# Patient Record
Sex: MALE | Race: WHITE | NOT HISPANIC OR LATINO | Employment: UNEMPLOYED | URBAN - METROPOLITAN AREA
[De-identification: names, ages, dates, MRNs, and addresses within clinical notes are randomized per-mention and may not be internally consistent; named-entity substitution may affect disease eponyms.]

---

## 2023-06-01 LAB
ALBUMIN (G/DL) IN SER/PLAS: 3.9 G/DL (ref 3.4–5)
ANION GAP IN SER/PLAS: 14 MMOL/L (ref 10–20)
CALCIUM (MG/DL) IN SER/PLAS: 10.4 MG/DL (ref 8.6–10.6)
CARBON DIOXIDE, TOTAL (MMOL/L) IN SER/PLAS: 25 MMOL/L (ref 21–32)
CHLORIDE (MMOL/L) IN SER/PLAS: 104 MMOL/L (ref 98–107)
CREATININE (MG/DL) IN SER/PLAS: 0.89 MG/DL (ref 0.5–1.3)
CREATININE (MG/DL) IN URINE: 81 MG/DL (ref 20–370)
ERYTHROCYTE DISTRIBUTION WIDTH (RATIO) BY AUTOMATED COUNT: 12.9 % (ref 11.5–14.5)
ERYTHROCYTE MEAN CORPUSCULAR HEMOGLOBIN CONCENTRATION (G/DL) BY AUTOMATED: 31.8 G/DL (ref 32–36)
ERYTHROCYTE MEAN CORPUSCULAR VOLUME (FL) BY AUTOMATED COUNT: 91 FL (ref 80–100)
ERYTHROCYTES (10*6/UL) IN BLOOD BY AUTOMATED COUNT: 5.33 X10E12/L (ref 4.5–5.9)
GFR MALE: >90 ML/MIN/1.73M2
GLUCOSE (MG/DL) IN SER/PLAS: 135 MG/DL (ref 74–99)
HEMATOCRIT (%) IN BLOOD BY AUTOMATED COUNT: 48.4 % (ref 41–52)
HEMOGLOBIN (G/DL) IN BLOOD: 15.4 G/DL (ref 13.5–17.5)
LEUKOCYTES (10*3/UL) IN BLOOD BY AUTOMATED COUNT: 10.6 X10E9/L (ref 4.4–11.3)
NRBC (PER 100 WBCS) BY AUTOMATED COUNT: 0 /100 WBC (ref 0–0)
PHOSPHATE (MG/DL) IN SER/PLAS: 2.9 MG/DL (ref 2.5–4.9)
PLATELETS (10*3/UL) IN BLOOD AUTOMATED COUNT: 343 X10E9/L (ref 150–450)
POTASSIUM (MMOL/L) IN SER/PLAS: 4.5 MMOL/L (ref 3.5–5.3)
PROTEIN (MG/DL) IN URINE: 14 MG/DL (ref 5–25)
PROTEIN/CREATININE (MG/MG) IN URINE: 0.17 MG/MG CREAT (ref 0–0.17)
SODIUM (MMOL/L) IN SER/PLAS: 138 MMOL/L (ref 136–145)
TACROLIMUS (NG/ML) IN BLOOD: 3.3 NG/ML (ref 2–15)
UREA NITROGEN (MG/DL) IN SER/PLAS: 13 MG/DL (ref 6–23)

## 2023-06-20 LAB — TACROLIMUS (NG/ML) IN BLOOD: 5.7 NG/ML (ref 2–15)

## 2023-07-07 LAB
ALBUMIN (G/DL) IN SER/PLAS: 3.9 G/DL (ref 3.4–5)
ANION GAP IN SER/PLAS: 14 MMOL/L (ref 10–20)
BASOPHILS (10*3/UL) IN BLOOD BY AUTOMATED COUNT: 0.05 X10E9/L (ref 0–0.1)
BASOPHILS/100 LEUKOCYTES IN BLOOD BY AUTOMATED COUNT: 0.5 % (ref 0–2)
CALCIUM (MG/DL) IN SER/PLAS: 10.4 MG/DL (ref 8.6–10.6)
CARBON DIOXIDE, TOTAL (MMOL/L) IN SER/PLAS: 23 MMOL/L (ref 21–32)
CHLORIDE (MMOL/L) IN SER/PLAS: 101 MMOL/L (ref 98–107)
CREATININE (MG/DL) IN SER/PLAS: 0.86 MG/DL (ref 0.5–1.3)
EOSINOPHILS (10*3/UL) IN BLOOD BY AUTOMATED COUNT: 0.19 X10E9/L (ref 0–0.7)
EOSINOPHILS/100 LEUKOCYTES IN BLOOD BY AUTOMATED COUNT: 2 % (ref 0–6)
ERYTHROCYTE DISTRIBUTION WIDTH (RATIO) BY AUTOMATED COUNT: 12 % (ref 11.5–14.5)
ERYTHROCYTE MEAN CORPUSCULAR HEMOGLOBIN CONCENTRATION (G/DL) BY AUTOMATED: 31 G/DL (ref 32–36)
ERYTHROCYTE MEAN CORPUSCULAR VOLUME (FL) BY AUTOMATED COUNT: 94 FL (ref 80–100)
ERYTHROCYTES (10*6/UL) IN BLOOD BY AUTOMATED COUNT: 4.85 X10E12/L (ref 4.5–5.9)
GFR MALE: >90 ML/MIN/1.73M2
GLUCOSE (MG/DL) IN SER/PLAS: 251 MG/DL (ref 74–99)
HEMATOCRIT (%) IN BLOOD BY AUTOMATED COUNT: 45.5 % (ref 41–52)
HEMOGLOBIN (G/DL) IN BLOOD: 14.1 G/DL (ref 13.5–17.5)
IMMATURE GRANULOCYTES/100 LEUKOCYTES IN BLOOD BY AUTOMATED COUNT: 0.5 % (ref 0–0.9)
LEUKOCYTES (10*3/UL) IN BLOOD BY AUTOMATED COUNT: 9.4 X10E9/L (ref 4.4–11.3)
LYMPHOCYTES (10*3/UL) IN BLOOD BY AUTOMATED COUNT: 1.2 X10E9/L (ref 1.2–4.8)
LYMPHOCYTES/100 LEUKOCYTES IN BLOOD BY AUTOMATED COUNT: 12.8 % (ref 13–44)
MONOCYTES (10*3/UL) IN BLOOD BY AUTOMATED COUNT: 1.09 X10E9/L (ref 0.1–1)
MONOCYTES/100 LEUKOCYTES IN BLOOD BY AUTOMATED COUNT: 11.7 % (ref 2–10)
NEUTROPHILS (10*3/UL) IN BLOOD BY AUTOMATED COUNT: 6.77 X10E9/L (ref 1.2–7.7)
NEUTROPHILS/100 LEUKOCYTES IN BLOOD BY AUTOMATED COUNT: 72.5 % (ref 40–80)
NRBC (PER 100 WBCS) BY AUTOMATED COUNT: 0 /100 WBC (ref 0–0)
PHOSPHATE (MG/DL) IN SER/PLAS: 2.4 MG/DL (ref 2.5–4.9)
PLATELETS (10*3/UL) IN BLOOD AUTOMATED COUNT: 269 X10E9/L (ref 150–450)
POTASSIUM (MMOL/L) IN SER/PLAS: 4.8 MMOL/L (ref 3.5–5.3)
SODIUM (MMOL/L) IN SER/PLAS: 133 MMOL/L (ref 136–145)
TACROLIMUS (NG/ML) IN BLOOD: 6 NG/ML (ref 2–15)
UREA NITROGEN (MG/DL) IN SER/PLAS: 17 MG/DL (ref 6–23)

## 2023-11-01 PROBLEM — Z99.2 DIALYSIS PATIENT (CMS-HCC): Status: ACTIVE | Noted: 2017-12-31

## 2023-11-01 PROBLEM — G40.909 SEIZURE DISORDER (MULTI): Status: ACTIVE | Noted: 2023-11-01

## 2023-11-01 PROBLEM — D75.1 POLYCYTHEMIA: Status: ACTIVE | Noted: 2023-11-01

## 2023-11-01 PROBLEM — E78.00 HYPERCHOLESTEREMIA: Status: ACTIVE | Noted: 2023-11-01

## 2023-11-01 PROBLEM — B17.10 ACUTE HEPATITIS C VIRUS INFECTION WITHOUT HEPATIC COMA: Status: ACTIVE | Noted: 2020-03-25

## 2023-11-01 PROBLEM — R00.2 PALPITATION: Status: ACTIVE | Noted: 2023-11-01

## 2023-11-01 PROBLEM — T85.611A PD CATHETER DYSFUNCTION (CMS-HCC): Status: ACTIVE | Noted: 2017-12-31

## 2023-11-01 PROBLEM — B19.20 HEPATITIS C VIRUS INFECTION: Status: ACTIVE | Noted: 2023-11-01

## 2023-11-01 PROBLEM — E66.9 OBESITY, CLASS II, BMI 35-39.9: Status: ACTIVE | Noted: 2023-03-17

## 2023-11-01 PROBLEM — R25.2 CRAMPING OF HANDS: Status: ACTIVE | Noted: 2023-11-01

## 2023-11-01 PROBLEM — T14.8XXA LOCAL INFECTION OF WOUND: Status: ACTIVE | Noted: 2023-11-01

## 2023-11-01 PROBLEM — T86.19 DELAYED GRAFT FUNCTION OF KIDNEY (HHS-HCC): Status: ACTIVE | Noted: 2020-02-25

## 2023-11-01 PROBLEM — T81.49XA INCISIONAL INFECTION: Status: ACTIVE | Noted: 2023-11-01

## 2023-11-01 PROBLEM — R07.9 CHEST PAIN: Status: ACTIVE | Noted: 2019-01-24

## 2023-11-01 PROBLEM — Z92.25 PERSONAL HISTORY OF IMMUNOSUPRESSION THERAPY: Status: ACTIVE | Noted: 2022-08-11

## 2023-11-01 PROBLEM — J81.0 PULMONARY EDEMA, ACUTE (MULTI): Status: ACTIVE | Noted: 2018-03-19

## 2023-11-01 PROBLEM — G45.9 TIA (TRANSIENT ISCHEMIC ATTACK): Status: ACTIVE | Noted: 2018-01-12

## 2023-11-01 PROBLEM — K21.9 GASTROESOPHAGEAL REFLUX DISEASE: Status: ACTIVE | Noted: 2023-11-01

## 2023-11-01 PROBLEM — R20.9 COLD HANDS: Status: ACTIVE | Noted: 2023-11-01

## 2023-11-01 PROBLEM — N18.9 CHRONIC KIDNEY DISEASE: Status: ACTIVE | Noted: 2023-11-01

## 2023-11-01 PROBLEM — I10 BENIGN ESSENTIAL HYPERTENSION: Status: ACTIVE | Noted: 2017-11-14

## 2023-11-01 PROBLEM — L97.421: Status: ACTIVE | Noted: 2020-07-13

## 2023-11-01 PROBLEM — R21 RASH IN ADULT: Status: ACTIVE | Noted: 2023-11-01

## 2023-11-01 PROBLEM — N18.6 ESRD ON PERITONEAL DIALYSIS (MULTI): Status: ACTIVE | Noted: 2018-03-12

## 2023-11-01 PROBLEM — Z99.2 ESRD ON PERITONEAL DIALYSIS (MULTI): Status: ACTIVE | Noted: 2018-03-12

## 2023-11-01 PROBLEM — D63.1 ANEMIA IN ESRD (END-STAGE RENAL DISEASE) (MULTI): Status: ACTIVE | Noted: 2018-03-09

## 2023-11-01 PROBLEM — D64.9 ANEMIA: Status: ACTIVE | Noted: 2018-03-09

## 2023-11-01 PROBLEM — N19 UREMIA SYNDROME: Status: ACTIVE | Noted: 2017-12-31

## 2023-11-01 PROBLEM — E66.812 OBESITY, CLASS II, BMI 35-39.9: Status: ACTIVE | Noted: 2023-03-17

## 2023-11-01 PROBLEM — R90.89 ABNORMAL MRA, BRAIN: Status: ACTIVE | Noted: 2023-11-01

## 2023-11-01 PROBLEM — N18.6 ANEMIA IN ESRD (END-STAGE RENAL DISEASE) (MULTI): Status: ACTIVE | Noted: 2018-03-09

## 2023-11-01 PROBLEM — Q61.02 MULTIPLE RENAL CYSTS: Status: ACTIVE | Noted: 2023-11-01

## 2023-11-01 PROBLEM — G45.9 TRANSIENT CEREBRAL ISCHEMIA: Status: ACTIVE | Noted: 2018-01-13

## 2023-11-01 PROBLEM — Z96.41 INSULIN PUMP STATUS: Status: ACTIVE | Noted: 2018-01-24

## 2023-11-01 PROBLEM — R73.9 ACUTE HYPERGLYCEMIA: Status: ACTIVE | Noted: 2019-01-03

## 2023-11-01 PROBLEM — M79.89 LEG SWELLING: Status: ACTIVE | Noted: 2018-02-22

## 2023-11-01 PROBLEM — E87.70 HYPERVOLEMIA: Status: ACTIVE | Noted: 2018-03-12

## 2023-11-01 PROBLEM — I16.1 HYPERTENSIVE EMERGENCY: Status: ACTIVE | Noted: 2018-01-24

## 2023-11-01 PROBLEM — G45.9 TRANSIENT ISCHEMIC ATTACK: Status: ACTIVE | Noted: 2023-11-01

## 2023-11-01 PROBLEM — N18.6 ESRD (END STAGE RENAL DISEASE) (MULTI): Status: ACTIVE | Noted: 2018-04-29

## 2023-11-01 PROBLEM — K21.9 ACID REFLUX: Status: ACTIVE | Noted: 2023-11-01

## 2023-11-01 PROBLEM — T86.19: Status: ACTIVE | Noted: 2020-03-25

## 2023-11-01 PROBLEM — R90.89 MAGNETIC RESONANCE IMAGING OF BRAIN ABNORMAL: Status: ACTIVE | Noted: 2023-11-01

## 2023-11-01 PROBLEM — L08.9 LOCAL INFECTION OF WOUND: Status: ACTIVE | Noted: 2023-11-01

## 2023-11-01 PROBLEM — Z94.0 HISTORY OF KIDNEY TRANSPLANT (HHS-HCC): Status: ACTIVE | Noted: 2020-02-24

## 2023-11-01 PROBLEM — B19.20 HCV (HEPATITIS C VIRUS): Status: ACTIVE | Noted: 2023-11-01

## 2023-11-01 PROBLEM — D84.9 IMMUNOSUPPRESSION (MULTI): Status: ACTIVE | Noted: 2023-11-01

## 2023-11-01 PROBLEM — G89.18 POST-OP PAIN: Status: ACTIVE | Noted: 2023-11-01

## 2023-11-01 PROBLEM — Q61.3 POLYCYSTIC KIDNEY: Status: ACTIVE | Noted: 2023-11-01

## 2023-11-01 PROBLEM — K59.03 DRUG-INDUCED CONSTIPATION: Status: ACTIVE | Noted: 2023-11-01

## 2023-11-01 RX ORDER — NITROGLYCERIN 0.4 MG/1
TABLET SUBLINGUAL
COMMUNITY
Start: 2016-08-12 | End: 2024-04-03 | Stop reason: WASHOUT

## 2023-11-01 RX ORDER — INSULIN ASPART 100 [IU]/ML
INJECTION, SOLUTION INTRAVENOUS; SUBCUTANEOUS
COMMUNITY
Start: 2019-05-12

## 2023-11-01 RX ORDER — ASPIRIN 81 MG/1
1 TABLET ORAL DAILY
COMMUNITY
Start: 2018-01-16

## 2023-11-01 RX ORDER — CALCIUM CARBONATE 200(500)MG
1 TABLET,CHEWABLE ORAL EVERY 4 HOURS PRN
COMMUNITY
Start: 2020-02-28 | End: 2024-04-03 | Stop reason: WASHOUT

## 2023-11-01 RX ORDER — TACROLIMUS 0.5 MG/1
CAPSULE ORAL
COMMUNITY
Start: 2020-02-26 | End: 2023-12-26 | Stop reason: SDUPTHER

## 2023-11-01 RX ORDER — PRAVASTATIN SODIUM 40 MG/1
1 TABLET ORAL NIGHTLY
COMMUNITY
Start: 2020-03-20 | End: 2024-04-03 | Stop reason: WASHOUT

## 2023-11-01 RX ORDER — CARVEDILOL 6.25 MG/1
6.25 TABLET ORAL 2 TIMES DAILY
COMMUNITY
Start: 2020-03-02 | End: 2023-12-24 | Stop reason: SDUPTHER

## 2023-11-01 RX ORDER — INSULIN LISPRO 100 [IU]/ML
INJECTION, SOLUTION INTRAVENOUS; SUBCUTANEOUS
COMMUNITY
Start: 2016-06-03

## 2023-11-01 RX ORDER — LISINOPRIL 10 MG/1
2 TABLET ORAL DAILY
COMMUNITY
Start: 2020-04-21 | End: 2024-01-02 | Stop reason: SDUPTHER

## 2023-11-01 RX ORDER — CLOTRIMAZOLE 10 MG/1
10 LOZENGE ORAL; TOPICAL 3 TIMES DAILY
COMMUNITY
End: 2024-04-03 | Stop reason: WASHOUT

## 2023-11-01 RX ORDER — AMITRIPTYLINE HYDROCHLORIDE 100 MG/1
1 TABLET ORAL DAILY
COMMUNITY
Start: 2020-02-28

## 2023-11-01 RX ORDER — LACTULOSE 10 G/15ML
15 SOLUTION ORAL; RECTAL DAILY PRN
COMMUNITY
Start: 2020-03-02 | End: 2024-04-03 | Stop reason: WASHOUT

## 2023-11-01 RX ORDER — CEPHALEXIN 500 MG/1
500 CAPSULE ORAL 2 TIMES DAILY
COMMUNITY
Start: 2020-03-20 | End: 2024-04-03 | Stop reason: WASHOUT

## 2023-11-01 RX ORDER — DOCUSATE SODIUM 100 MG/1
100 CAPSULE, LIQUID FILLED ORAL 2 TIMES DAILY
COMMUNITY
Start: 2020-02-28 | End: 2024-04-03 | Stop reason: WASHOUT

## 2023-11-01 RX ORDER — CLONIDINE HYDROCHLORIDE 0.3 MG/1
0.3 TABLET ORAL
COMMUNITY
End: 2024-03-08 | Stop reason: ALTCHOICE

## 2023-11-01 RX ORDER — POLYETHYLENE GLYCOL 3350 17 G/17G
17 POWDER, FOR SOLUTION ORAL
COMMUNITY
Start: 2020-02-28 | End: 2024-04-03 | Stop reason: WASHOUT

## 2023-11-01 RX ORDER — ACYCLOVIR 200 MG/1
CAPSULE ORAL
COMMUNITY
Start: 2020-02-28 | End: 2024-04-03 | Stop reason: WASHOUT

## 2023-11-01 RX ORDER — INSULIN GLARGINE 100 [IU]/ML
INJECTION, SOLUTION SUBCUTANEOUS
COMMUNITY
Start: 2021-05-20

## 2023-11-01 RX ORDER — OXYCODONE HYDROCHLORIDE 5 MG/1
5 TABLET ORAL EVERY 6 HOURS PRN
COMMUNITY
End: 2024-04-03 | Stop reason: WASHOUT

## 2023-11-01 RX ORDER — SULFAMETHOXAZOLE AND TRIMETHOPRIM 400; 80 MG/1; MG/1
1 TABLET ORAL
COMMUNITY
Start: 2020-02-28 | End: 2024-04-03 | Stop reason: WASHOUT

## 2023-11-01 RX ORDER — PANTOPRAZOLE SODIUM 40 MG/1
40 FOR SUSPENSION ORAL
COMMUNITY

## 2023-11-01 RX ORDER — GLUCAGON INJECTION, SOLUTION 1 MG/.2ML
INJECTION, SOLUTION SUBCUTANEOUS
COMMUNITY
Start: 2022-01-19

## 2023-11-01 RX ORDER — CALCIUM ACETATE 667 MG/1
667 CAPSULE ORAL
COMMUNITY
End: 2024-04-03 | Stop reason: WASHOUT

## 2023-11-01 RX ORDER — AMLODIPINE BESYLATE 5 MG/1
TABLET ORAL
COMMUNITY
Start: 2020-03-02 | End: 2024-03-08 | Stop reason: WASHOUT

## 2023-11-01 RX ORDER — ATORVASTATIN CALCIUM 40 MG/1
40 TABLET, FILM COATED ORAL DAILY
COMMUNITY
End: 2024-04-03 | Stop reason: WASHOUT

## 2023-11-01 RX ORDER — BLOOD SUGAR DIAGNOSTIC
STRIP MISCELLANEOUS
COMMUNITY
Start: 2017-06-28 | End: 2024-04-03 | Stop reason: WASHOUT

## 2023-11-01 RX ORDER — SILDENAFIL 50 MG/1
TABLET, FILM COATED ORAL
COMMUNITY
Start: 2021-02-22

## 2023-11-01 RX ORDER — BLOOD SUGAR DIAGNOSTIC
STRIP MISCELLANEOUS
COMMUNITY
Start: 2023-03-20

## 2023-11-01 RX ORDER — FUROSEMIDE 40 MG/1
1 TABLET ORAL DAILY
COMMUNITY
Start: 2020-02-28 | End: 2023-11-04 | Stop reason: SDUPTHER

## 2023-11-01 RX ORDER — LEVOTHYROXINE SODIUM 50 UG/1
1 TABLET ORAL DAILY
COMMUNITY
Start: 2016-12-22

## 2023-11-04 DIAGNOSIS — Z94.0 KIDNEY REPLACED BY TRANSPLANT (HHS-HCC): Primary | ICD-10-CM

## 2023-11-04 RX ORDER — FUROSEMIDE 40 MG/1
40 TABLET ORAL DAILY
Qty: 90 TABLET | Refills: 0 | Status: SHIPPED | OUTPATIENT
Start: 2023-11-04 | End: 2024-03-06 | Stop reason: SDUPTHER

## 2023-11-15 ENCOUNTER — PHARMACY VISIT (OUTPATIENT)
Dept: PHARMACY | Facility: CLINIC | Age: 38
End: 2023-11-15
Payer: COMMERCIAL

## 2023-11-15 ENCOUNTER — SPECIALTY PHARMACY (OUTPATIENT)
Dept: PHARMACY | Facility: CLINIC | Age: 38
End: 2023-11-15

## 2023-11-15 PROCEDURE — RXMED WILLOW AMBULATORY MEDICATION CHARGE

## 2023-12-24 ENCOUNTER — TELEPHONE (OUTPATIENT)
Dept: TRANSPLANT | Facility: HOSPITAL | Age: 38
End: 2023-12-24
Payer: MEDICARE

## 2023-12-24 DIAGNOSIS — Z94.0 KIDNEY REPLACED BY TRANSPLANT (HHS-HCC): ICD-10-CM

## 2023-12-24 RX ORDER — TACROLIMUS 1 MG/1
1 CAPSULE ORAL 2 TIMES DAILY
Qty: 14 CAPSULE | Refills: 0 | Status: SHIPPED | OUTPATIENT
Start: 2023-12-24 | End: 2023-12-26 | Stop reason: SDUPTHER

## 2023-12-24 RX ORDER — PREDNISONE 5 MG/1
5 TABLET ORAL DAILY
Qty: 7 TABLET | Refills: 0 | Status: SHIPPED | OUTPATIENT
Start: 2023-12-24 | End: 2023-12-26 | Stop reason: SDUPTHER

## 2023-12-24 RX ORDER — CARVEDILOL 6.25 MG/1
6.25 TABLET ORAL 2 TIMES DAILY
Qty: 14 TABLET | Refills: 0 | Status: SHIPPED | OUTPATIENT
Start: 2023-12-24 | End: 2023-12-26 | Stop reason: SDUPTHER

## 2023-12-25 NOTE — TELEPHONE ENCOUNTER
Call Coordinator: Patient called at about 1735 to report being out of his Carvedilol, Tacrolimus, and Prednisone.  His local pharmacy was closed.  [facility] in [place] open.  One week's worth of prescriptions pended to Dr. Crowley to send to [facility].  Message sent to Dr. Crowley, who signed RX refill orders.  Patient notified RX's sent and to call [facility] for estimate on  time.  Also advised to call [facility] on Tuesday morning regarding full prescriptions needed.  He thanked me and verbalized an understanding.

## 2023-12-26 ENCOUNTER — SPECIALTY PHARMACY (OUTPATIENT)
Dept: PHARMACY | Facility: CLINIC | Age: 38
End: 2023-12-26

## 2023-12-26 DIAGNOSIS — Z94.0 KIDNEY REPLACED BY TRANSPLANT (HHS-HCC): ICD-10-CM

## 2023-12-26 DIAGNOSIS — Z94.0 KIDNEY REPLACED BY TRANSPLANT (HHS-HCC): Primary | ICD-10-CM

## 2023-12-26 RX ORDER — PREDNISONE 5 MG/1
5 TABLET ORAL DAILY
Qty: 30 TABLET | Refills: 11 | Status: SHIPPED | OUTPATIENT
Start: 2023-12-26 | End: 2024-04-03 | Stop reason: WASHOUT

## 2023-12-26 RX ORDER — CARVEDILOL 6.25 MG/1
6.25 TABLET ORAL 2 TIMES DAILY
Qty: 60 TABLET | Refills: 11 | Status: SHIPPED | OUTPATIENT
Start: 2023-12-26 | End: 2024-03-08 | Stop reason: SDUPTHER

## 2023-12-26 RX ORDER — TACROLIMUS 0.5 MG/1
0.5 CAPSULE ORAL 2 TIMES DAILY
Qty: 60 CAPSULE | Refills: 11 | Status: SHIPPED | OUTPATIENT
Start: 2023-12-26 | End: 2024-05-17 | Stop reason: ALTCHOICE

## 2023-12-26 RX ORDER — MYCOPHENOLATE MOFETIL 250 MG/1
CAPSULE ORAL
Qty: 240 CAPSULE | Refills: 11 | Status: SHIPPED | OUTPATIENT
Start: 2023-12-26 | End: 2024-04-03 | Stop reason: WASHOUT

## 2023-12-26 RX ORDER — TACROLIMUS 1 MG/1
1 CAPSULE ORAL 2 TIMES DAILY
Qty: 60 CAPSULE | Refills: 11 | Status: CANCELLED | OUTPATIENT
Start: 2023-12-26 | End: 2024-12-25

## 2023-12-26 RX ORDER — TACROLIMUS 1 MG/1
1 CAPSULE ORAL 2 TIMES DAILY
Qty: 60 CAPSULE | Refills: 11 | Status: SHIPPED | OUTPATIENT
Start: 2023-12-26 | End: 2024-06-08

## 2023-12-26 NOTE — TELEPHONE ENCOUNTER
Refills sent to Dr. Marion for approval:  Tac/Pred/MMF/Coreg  To [facility]

## 2023-12-27 RX ORDER — PREDNISONE 5 MG/1
5 TABLET ORAL DAILY
Qty: 30 TABLET | Refills: 11 | Status: SHIPPED | OUTPATIENT
Start: 2023-12-27 | End: 2024-04-04 | Stop reason: SDUPTHER

## 2023-12-28 ENCOUNTER — TELEPHONE (OUTPATIENT)
Dept: TRANSPLANT | Facility: HOSPITAL | Age: 38
End: 2023-12-28
Payer: MEDICARE

## 2023-12-28 RX ORDER — TACROLIMUS 1 MG/1
1 CAPSULE ORAL 2 TIMES DAILY
Qty: 60 CAPSULE | Refills: 11 | Status: CANCELLED | OUTPATIENT
Start: 2023-12-26 | End: 2024-12-25

## 2023-12-28 RX ORDER — MYCOPHENOLATE MOFETIL 250 MG/1
CAPSULE ORAL
Qty: 240 CAPSULE | Refills: 11 | Status: CANCELLED | OUTPATIENT
Start: 2023-12-28 | End: 2024-12-27

## 2023-12-31 DIAGNOSIS — Z94.0 KIDNEY REPLACED BY TRANSPLANT (HHS-HCC): Primary | ICD-10-CM

## 2024-01-02 RX ORDER — LISINOPRIL 10 MG/1
20 TABLET ORAL DAILY
Qty: 180 TABLET | Refills: 0 | Status: SHIPPED | OUTPATIENT
Start: 2024-01-02 | End: 2024-03-08 | Stop reason: ALTCHOICE

## 2024-01-16 DIAGNOSIS — Z94.0 KIDNEY REPLACED BY TRANSPLANT (HHS-HCC): Primary | ICD-10-CM

## 2024-01-16 RX ORDER — TACROLIMUS 1 MG/1
1 CAPSULE ORAL 2 TIMES DAILY
Qty: 60 CAPSULE | Refills: 11 | Status: CANCELLED | OUTPATIENT
Start: 2023-12-26 | End: 2024-12-25

## 2024-02-16 ENCOUNTER — TELEPHONE (OUTPATIENT)
Dept: TRANSPLANT | Facility: HOSPITAL | Age: 39
End: 2024-02-16

## 2024-02-20 RX ORDER — MYCOPHENOLATE MOFETIL 250 MG/1
CAPSULE ORAL
Qty: 240 CAPSULE | Refills: 11 | Status: SHIPPED | OUTPATIENT
Start: 2024-02-20 | End: 2024-04-03 | Stop reason: SDUPTHER

## 2024-03-06 DIAGNOSIS — Z94.0 KIDNEY REPLACED BY TRANSPLANT (HHS-HCC): ICD-10-CM

## 2024-03-06 RX ORDER — FUROSEMIDE 40 MG/1
40 TABLET ORAL DAILY
Qty: 90 TABLET | Refills: 0 | Status: SHIPPED | OUTPATIENT
Start: 2024-03-06 | End: 2024-06-08

## 2024-03-08 ENCOUNTER — TELEPHONE (OUTPATIENT)
Dept: TRANSPLANT | Facility: HOSPITAL | Age: 39
End: 2024-03-08
Payer: MEDICARE

## 2024-03-08 DIAGNOSIS — I15.1 HYPERTENSION SECONDARY TO OTHER RENAL DISORDERS: ICD-10-CM

## 2024-03-08 DIAGNOSIS — Z94.0 KIDNEY REPLACED BY TRANSPLANT (HHS-HCC): ICD-10-CM

## 2024-03-08 RX ORDER — LISINOPRIL 20 MG/1
20 TABLET ORAL DAILY
Qty: 30 TABLET | Refills: 11 | Status: SHIPPED | OUTPATIENT
Start: 2024-03-08 | End: 2025-03-08

## 2024-03-08 RX ORDER — HYDRALAZINE HYDROCHLORIDE 100 MG/1
100 TABLET, FILM COATED ORAL 2 TIMES DAILY
COMMUNITY
End: 2024-03-27 | Stop reason: SDUPTHER

## 2024-03-08 RX ORDER — CARVEDILOL 6.25 MG/1
12.5 TABLET ORAL 2 TIMES DAILY
Qty: 120 TABLET | Refills: 11 | Status: SHIPPED | OUTPATIENT
Start: 2024-03-08 | End: 2024-04-03 | Stop reason: SDUPTHER

## 2024-03-09 NOTE — TELEPHONE ENCOUNTER
ON CALL:  Patient just called in, with blood pressures all day in 170/100, most recent reading 178/104 with . Face bright red per wife, no dizziness or chest pain. Does not regularly take blood pressure. Is on Hydralazine 100 mg BID (was supposed to be on TID but said only takes BID due to making him dizzy) coreg 6.25 BID, Lasix 40 mg daily. Had BP med change Feb 9th when in hospital for bone infection. At that time his lisinopril 20 mg was stopped and hydralazine was started.     Notified Dr. Leija:  PLAN:  Increase coreg to 12.5 mg BID   Start lisinopril 20 mg daily  Continue hydralazine 100 mg BID   Monitor BP daily am and pm with blood pressure log  Will get labs upcoming week (past due)   Task sent to DANA Thrasher to schedule patient as soon as possible for follow up on blood pressure management.     Gave ER precautions  Advised to call if has any other questions or concerns  Scripts updated, patient has lisinopril at home as well

## 2024-03-11 ENCOUNTER — TELEPHONE (OUTPATIENT)
Dept: TRANSPLANT | Facility: HOSPITAL | Age: 39
End: 2024-03-11
Payer: MEDICARE

## 2024-03-11 NOTE — TELEPHONE ENCOUNTER
Returned call to patient,   BP readings so far:   3/9: 140/92 am 115/82 pm  3/10: 142/83 am 138/94 pm  3/11: 120/78 am    Asked patient to continue monitoring BP and bring recordings to appointment on 4/10/24

## 2024-03-19 ENCOUNTER — SPECIALTY PHARMACY (OUTPATIENT)
Dept: PHARMACY | Facility: CLINIC | Age: 39
End: 2024-03-19

## 2024-03-27 ENCOUNTER — TELEPHONE (OUTPATIENT)
Dept: TRANSPLANT | Facility: HOSPITAL | Age: 39
End: 2024-03-27
Payer: MEDICARE

## 2024-03-27 DIAGNOSIS — I15.1 HYPERTENSION SECONDARY TO OTHER RENAL DISORDERS: ICD-10-CM

## 2024-03-27 DIAGNOSIS — Z94.0 KIDNEY REPLACED BY TRANSPLANT (HHS-HCC): ICD-10-CM

## 2024-03-27 RX ORDER — HYDRALAZINE HYDROCHLORIDE 100 MG/1
100 TABLET, FILM COATED ORAL 2 TIMES DAILY
Qty: 60 TABLET | Refills: 11 | Status: SHIPPED | OUTPATIENT
Start: 2024-03-27 | End: 2024-04-03 | Stop reason: WASHOUT

## 2024-04-03 ENCOUNTER — OFFICE VISIT (OUTPATIENT)
Dept: TRANSPLANT | Facility: CLINIC | Age: 39
End: 2024-04-03
Payer: MEDICARE

## 2024-04-03 VITALS
OXYGEN SATURATION: 98 % | WEIGHT: 237.1 LBS | DIASTOLIC BLOOD PRESSURE: 94 MMHG | TEMPERATURE: 97.2 F | SYSTOLIC BLOOD PRESSURE: 174 MMHG | BODY MASS INDEX: 38.27 KG/M2 | HEART RATE: 90 BPM

## 2024-04-03 DIAGNOSIS — I10 ESSENTIAL HYPERTENSION: ICD-10-CM

## 2024-04-03 DIAGNOSIS — E21.3 HYPERPARATHYROIDISM (MULTI): ICD-10-CM

## 2024-04-03 DIAGNOSIS — Z94.0 KIDNEY REPLACED BY TRANSPLANT (HHS-HCC): Primary | ICD-10-CM

## 2024-04-03 DIAGNOSIS — Z94.0 IMMUNOSUPPRESSIVE MANAGEMENT ENCOUNTER FOLLOWING KIDNEY TRANSPLANT (HHS-HCC): ICD-10-CM

## 2024-04-03 DIAGNOSIS — Z79.899 IMMUNOSUPPRESSIVE MANAGEMENT ENCOUNTER FOLLOWING KIDNEY TRANSPLANT (HHS-HCC): ICD-10-CM

## 2024-04-03 DIAGNOSIS — E78.9 LIPID DISORDER: ICD-10-CM

## 2024-04-03 DIAGNOSIS — E55.9 VITAMIN D DEFICIENCY: ICD-10-CM

## 2024-04-03 DIAGNOSIS — E11.21 DIABETES MELLITUS WITH KIDNEY DISEASE (MULTI): ICD-10-CM

## 2024-04-03 PROCEDURE — 3077F SYST BP >= 140 MM HG: CPT | Performed by: INTERNAL MEDICINE

## 2024-04-03 PROCEDURE — 3080F DIAST BP >= 90 MM HG: CPT | Performed by: INTERNAL MEDICINE

## 2024-04-03 PROCEDURE — 99214 OFFICE O/P EST MOD 30 MIN: CPT

## 2024-04-03 PROCEDURE — 4010F ACE/ARB THERAPY RXD/TAKEN: CPT | Performed by: INTERNAL MEDICINE

## 2024-04-03 PROCEDURE — 3008F BODY MASS INDEX DOCD: CPT | Performed by: INTERNAL MEDICINE

## 2024-04-03 RX ORDER — CARVEDILOL 25 MG/1
25 TABLET ORAL 2 TIMES DAILY
Qty: 180 TABLET | Refills: 3 | Status: SHIPPED | OUTPATIENT
Start: 2024-04-03 | End: 2025-04-03

## 2024-04-03 RX ORDER — MYCOPHENOLATE MOFETIL 250 MG/1
750 CAPSULE ORAL 2 TIMES DAILY
Qty: 540 CAPSULE | Refills: 3 | Status: SHIPPED | OUTPATIENT
Start: 2024-04-03 | End: 2025-04-03

## 2024-04-03 RX ORDER — PRAVASTATIN SODIUM 40 MG/1
40 TABLET ORAL NIGHTLY
COMMUNITY

## 2024-04-03 ASSESSMENT — PAIN SCALES - GENERAL: PAINLEVEL: 0-NO PAIN

## 2024-04-03 NOTE — PROGRESS NOTES
TRANSPLANT NEPHROLOGY :   OUTPATIENT CLINIC NOTE      SERVICE DATE : 2024    REASON FOR VISIT/CHIEF COMPLAINT:  S/P  TRANSPLANT SURGERY  IMMUNOSUPPRESSIVE MEDICATION MANAGEMENT  BLOOD PRESSURE MANAGEMENT    HPI:    Mr. Ware is a 38 y.o. male with past medical history significant for seizure disorder, HLD, HTN, ESRD secondary to type 1 diabetes ( on insulin pump) s/p a  kidney transplant on 2020,( thymo Induction and HCV positive. The patient converted to HCV positive on 3/4/2020 s/p treatment.) diabetic retinopathy, diabetic wounds s/p L toe amputation.     Patient is here for follow up s/p kidney transplant.    Hospitalization - 2024 s/p left BKA    Hydralazine made him feel sick, dizzy. We will change BP meds.     Patient is doing well overall. No new complaints. Denied chest pain, SOB, AYON, Palpitation. Normal urination and bowel movement. Normal gait and no weakness of arms/legs. No cough, runny nose, sore throat, cold symptoms, or rash. No hearing loss. Normal vision.No problems with his sleep, mood and function. No recent infection, hospitalization, surgery or ER visits.      ROS:  Review of  14 systems was performed system by system. See HPI. Otherwise, the symptoms were negative.    PAST MEDICAL HISTORY:  Past Medical History:   Diagnosis Date    Encounter for other preprocedural examination 2020    Pre-transplant evaluation for kidney transplant    Encounter for other preprocedural examination 2020    Pre-transplant evaluation for kidney and pancreas transplant    End stage renal disease (CMS/HCC) 2020    ESRD on dialysis    Transient cerebral ischemic attack, unspecified 2019    TIA (transient ischemic attack)        PAST SURGICAL HISTORY:  Past Surgical History:   Procedure Laterality Date    CT HEAD ANGIO W AND WO IV CONTRAST  3/21/2019    CT HEAD ANGIO W AND WO IV CONTRAST 3/21/2019 [facility] [place]    MR HEAD ANGIO WO IV CONTRAST  3/7/2019     MR HEAD ANGIO WO IV CONTRAST [place] [facility] [place]    MR NECK ANGIO WO IV CONTRAST  3/7/2019    MR NECK ANGIO WO IV CONTRAST [place] [facility] [place]    OTHER SURGICAL HISTORY  02/21/2019    Venous access port placement    OTHER SURGICAL HISTORY  02/21/2019    Eye surgery        SOCIAL HISTORY:  Social History     Socioeconomic History    Marital status:      Spouse name: Not on file    Number of children: Not on file    Years of education: Not on file    Highest education level: Not on file   Occupational History    Not on file   Tobacco Use    Smoking status: Not on file    Smokeless tobacco: Not on file   Substance and Sexual Activity    Alcohol use: Not on file    Drug use: Not on file    Sexual activity: Not on file   Other Topics Concern    Not on file   Social History Narrative    Not on file     Social Determinants of Health     Financial Resource Strain: Not on file   Food Insecurity: Not on file   Transportation Needs: Not on file   Physical Activity: Not on file   Stress: Not on file   Social Connections: Not on file   Intimate Partner Violence: Not on file   Housing Stability: Not on file       FAMILY HISTORY:  Family History   Problem Relation Name Age of Onset    Diabetes type II Mother      Diabetes type II Father      Diabetes type II Half-Sister Maternal        MEDICATION LIST:  Current Outpatient Medications   Medication Instructions    acyclovir (Zovirax) 200 mg capsule 1 cap(s) orally 2 times a day at 9 AM and 9 PM    amitriptyline (Elavil) 100 mg tablet 1 tablet, oral, Daily    aspirin 81 mg EC tablet 1 tablet, oral, Daily    atorvastatin (LIPITOR) 40 mg, oral, Daily    blood sugar diagnostic (Contour Next Test Strips) strip Use 5 strips daily    calcium acetate (PHOSLO) 667 mg, oral    calcium carbonate (Tums) 200 mg calcium chewable tablet 1 tablet, oral, Every 4 hours PRN, SEPARATE FROM IMMUNOSUPPRESSION MEDICATIONS BY AT LEAST 2 HOURS    carvedilol (COREG) 12.5 mg,  oral, 2 times daily    cephalexin (KEFLEX) 500 mg, oral, 2 times daily    clotrimazole (MYCELEX) 10 mg, oral, 3 times daily    docusate sodium (COLACE) 100 mg, oral, 2 times daily, for constipation while taking narcotics. Hold for loose stool    furosemide (LASIX) 40 mg, oral, Daily    glecaprevir-pibrentasvir (Mavyret) 100-40 mg tablet tablet oral    glucagon (Gvoke HypoPen 2-Pack) 1 mg/0.2 mL auto-injector Use as directed    insulin aspart (NovoLOG) 100 unit/mL injection Inject subcutaneously 60 units daily via insulin pump. DX: E10.65.    insulin glargine (Lantus Solostar U-100 Insulin) 100 unit/mL (3 mL) pen Inject subcutaneously 62 units daily if insulin pump fails. DX: E10.649    insulin lispro (HumaLOG) 100 unit/mL injection subcutaneous    insulin regular (HumuLIN R) 100 unit/mL injection subcutaneous    lactulose 20 gram/30 mL oral solution 15 mL, oral, Daily PRN    levothyroxine (Synthroid, Levoxyl) 50 mcg tablet 1 tablet, oral, Daily    lisinopril 20 mg, oral, Daily    mycophenolate (Cellcept) 250 mg capsule TAKE FOUR (4) CAPSULES BY MOUTH TWICE DAILY AS DIRECTED AT 9AM AND 9PM    mycophenolate (Cellcept) 250 mg capsule TAKE FOUR (4) CAPSULES BY MOUTH TWICE DAILY AS DIRECTED AT 9AM AND 9PM    nitroglycerin (Nitrostat) 0.4 mg SL tablet Dissolve 1 tablet under the tongue as needed. FOR CHEST PAIN. IF NO RELIEF CALL 911    OneTouch Ultra Test strip OneTouch Ultra Blue STRP Refills: 0 DO Start : 28-Jun-2017 Active    oxyCODONE (ROXICODONE) 5 mg, oral, Every 6 hours PRN    pantoprazole (PROTONIX) 40 mg, oral    polyethylene glycol (GLYCOLAX, MIRALAX) 17 g, oral, Daily RT, As needed for constipation    pravastatin (Pravachol) 40 mg tablet 1 tablet, oral, Nightly    predniSONE (Deltasone) 5 mg tablet TAKE ONE (1) TABLET BY MOUTH DAILY.    predniSONE (Deltasone) 5 mg tablet TAKE ONE (1) TABLET BY MOUTH DAILY.    sildenafil (Viagra) 50 mg tablet Take 1-2 tablets 1 hour  Before anticipated intercourse.     "sulfamethoxazole-trimethoprim (Bactrim) 400-80 mg tablet 1 tablet, oral, once a day at 9 AM    tacrolimus (Prograf) 1 mg capsule TAKE ONE (1) CAPSULE BY MOUTH TWICE DAILY    tacrolimus (PROGRAF) 0.5 mg, oral, 2 times daily, TAKE 1 CAPSULE Every twelve hours Take with 1mg capsules to equal 1.5mg every 12 hours       ALLERGY  Allergies   Allergen Reactions    Adhesive Unknown     Ape 1 \" x5yD    Nsaids (Non-Steroidal Anti-Inflammatory Drug) Unknown    Oxycodone Unknown    Penicillin Unknown    Clindamycin Rash    Morphine GI Upset, Nausea And Vomiting and Unknown    Oxycodone-Acetaminophen Nausea And Vomiting, Unknown and Nausea/vomiting       PHYSICAL EXAM:    Visit Vitals  BP (!) 174/94   Pulse 90   Temp 36.2 °C (97.2 °F) (Temporal)   Wt 108 kg (237 lb 1.6 oz)   SpO2 98%   BMI 38.27 kg/m²   BSA 2.24 m²          Vital signs - reviewed. Acceptable BP at this office visit.   General Appearance - NAD, Good speech, oriented and alert  HEENT - Supple. Not pale. No jaundice. No cervical lymphadenopathy. Pharynx and tonsils are not injected.  CVS - RRR. Normal S1/S2. No murmur, click , rub or gallop  Lungs- clear to auscultation bilaterally  Abdomen - soft , not tender, no guarding, no rigidity. No hepatosplenomegaly. Normal bowel sounds. No masses and ascites. S/P Kidney transplant .  Transplanted kidney is not tender.   Musculoskeletal /Extremities - no edema. Full ROM. No joint tenderness.   Neuro/Psych - appropriate mood and affect. Motor power V/V all extremities. CN I -XII were grossly intact.  Skin - No visible rash      LABS:    Lab Results   Component Value Date    WBC 9.4 07/06/2023    HGB 14.1 07/06/2023    HCT 45.5 07/06/2023     07/06/2023    ALT 25 06/30/2023    AST 16 06/30/2023     (L) 07/06/2023    K 4.8 07/06/2023     07/06/2023    CREATININE 0.86 07/06/2023    BUN 17 07/06/2023    CO2 23 07/06/2023    TSH 1.57 06/29/2020    INR 1.6 (H) 02/28/2020    HGBA1C 11.1 (H) 08/28/2023 "     par    ASSESSMENT AND PLAN:    Mr. Ware is a 38 y.o. male  who is here for follow up s/p kidney transplant.    TRANSPLANT DATE: 2/24/2020 (Kidney)      1. ESRD S/P kidney transplant   - Creatinine last check was :  Lab Results   Component Value Date    CREATININE 0.86 07/06/2023     Creatinine clearance cannot be calculated (Patient's most recent lab result is older than the maximum 7 days allowed.)    - Renal allograft function is GOOD, Cr 1.1 in Feb 2024  -Random urine protein/creatinine ratio is normal in Feb 2024 (see CareEverywhere)  -Ensure adequate hydration  - Avoid nephrotoxic medications, NSAIDs, and IV contrast.    2. Immunosuppression  -Tacrolimus level last check was N/A. Will repeat lab.   -Continue current immunosuppression regimen.    3. Electrolytes  Lab Results   Component Value Date    GLUCOSE 251 (H) 07/06/2023    CALCIUM 10.4 07/06/2023     (L) 07/06/2023    K 4.8 07/06/2023    CO2 23 07/06/2023     07/06/2023    BUN 17 07/06/2023    CREATININE 0.86 07/06/2023     -Acceptable from last lab drawn    4. Hypertension  Blood Pressures         4/3/2024  0832             BP: 174/94          -Home  BP had been acceptable  -Encourage to monitor home BP  -Continue current anti hypertensive medication  -STOP Hydralazine  - increase Coreg to 25 mg bid.  -if BP remains > 130/80 after increasing Coreg dosage, can increase lisinopril from 20 to 40 mg daily. Lab in 2 weeks.    5. Bone Mineral Disease/Osteoporosis  Lab Results   Component Value Date    CALCIUM 10.4 07/06/2023    CAION 1.10 02/28/2020    PHOS 2.4 (L) 07/06/2023     -check VIT D, PTH with next lab  - Consider DEXA every 2-3 years , defer to PCP    6.Anemia  Lab Results   Component Value Date    WBC 9.4 07/06/2023    HGB 14.1 07/06/2023    HCT 45.5 07/06/2023    MCV 94 07/06/2023     07/06/2023     -asymptomatic  - Continue to monitor  -check iron studies and ferritin. Will consider JUAN as needed.  - No indications  for blood transfusion     7.Health maintenance and vaccination  - Flu shot during flu season annually  - Cancer screening is up to date per the patient    Lab : in 2 weeks      RTC 6 month(s)    Debbie Leija    Transplant Nephrology

## 2024-04-04 ENCOUNTER — TELEPHONE (OUTPATIENT)
Dept: TRANSPLANT | Facility: HOSPITAL | Age: 39
End: 2024-04-04
Payer: MEDICARE

## 2024-04-04 DIAGNOSIS — Z94.0 KIDNEY REPLACED BY TRANSPLANT (HHS-HCC): Primary | ICD-10-CM

## 2024-04-04 RX ORDER — PREDNISONE 5 MG/1
5 TABLET ORAL DAILY
Qty: 90 TABLET | Refills: 3 | Status: SHIPPED | OUTPATIENT
Start: 2024-04-04 | End: 2025-04-04

## 2024-05-15 ENCOUNTER — TELEPHONE (OUTPATIENT)
Dept: TRANSPLANT | Facility: HOSPITAL | Age: 39
End: 2024-05-15
Payer: MEDICARE

## 2024-05-15 DIAGNOSIS — Z94.0 KIDNEY REPLACED BY TRANSPLANT (HHS-HCC): ICD-10-CM

## 2024-05-16 ENCOUNTER — LAB (OUTPATIENT)
Dept: LAB | Facility: LAB | Age: 39
End: 2024-05-16
Payer: MEDICARE

## 2024-05-16 DIAGNOSIS — E55.9 VITAMIN D DEFICIENCY: ICD-10-CM

## 2024-05-16 DIAGNOSIS — Z79.899 IMMUNOSUPPRESSIVE MANAGEMENT ENCOUNTER FOLLOWING KIDNEY TRANSPLANT (HHS-HCC): ICD-10-CM

## 2024-05-16 DIAGNOSIS — E21.3 HYPERPARATHYROIDISM (MULTI): ICD-10-CM

## 2024-05-16 DIAGNOSIS — Z94.0 KIDNEY REPLACED BY TRANSPLANT (HHS-HCC): ICD-10-CM

## 2024-05-16 DIAGNOSIS — Z94.0 IMMUNOSUPPRESSIVE MANAGEMENT ENCOUNTER FOLLOWING KIDNEY TRANSPLANT (HHS-HCC): ICD-10-CM

## 2024-05-16 DIAGNOSIS — I10 ESSENTIAL HYPERTENSION: ICD-10-CM

## 2024-05-16 PROCEDURE — 84156 ASSAY OF PROTEIN URINE: CPT

## 2024-05-16 PROCEDURE — 83970 ASSAY OF PARATHORMONE: CPT

## 2024-05-16 PROCEDURE — 85027 COMPLETE CBC AUTOMATED: CPT

## 2024-05-16 PROCEDURE — 80069 RENAL FUNCTION PANEL: CPT

## 2024-05-16 PROCEDURE — 80197 ASSAY OF TACROLIMUS: CPT

## 2024-05-16 PROCEDURE — 82570 ASSAY OF URINE CREATININE: CPT

## 2024-05-16 PROCEDURE — 36415 COLL VENOUS BLD VENIPUNCTURE: CPT

## 2024-05-16 PROCEDURE — 83735 ASSAY OF MAGNESIUM: CPT

## 2024-05-16 PROCEDURE — 82306 VITAMIN D 25 HYDROXY: CPT

## 2024-05-17 LAB
25(OH)D3 SERPL-MCNC: 11 NG/ML (ref 30–100)
ALBUMIN SERPL BCP-MCNC: 4.1 G/DL (ref 3.4–5)
ANION GAP SERPL CALC-SCNC: 15 MMOL/L (ref 10–20)
BUN SERPL-MCNC: 26 MG/DL (ref 6–23)
CALCIUM SERPL-MCNC: 9.9 MG/DL (ref 8.6–10.6)
CHLORIDE SERPL-SCNC: 101 MMOL/L (ref 98–107)
CO2 SERPL-SCNC: 24 MMOL/L (ref 21–32)
CREAT SERPL-MCNC: 1.38 MG/DL (ref 0.5–1.3)
CREAT UR-MCNC: 75.5 MG/DL (ref 20–370)
EGFRCR SERPLBLD CKD-EPI 2021: 66 ML/MIN/1.73M*2
ERYTHROCYTE [DISTWIDTH] IN BLOOD BY AUTOMATED COUNT: 13 % (ref 11.5–14.5)
GLUCOSE SERPL-MCNC: 315 MG/DL (ref 74–99)
HCT VFR BLD AUTO: 38.8 % (ref 41–52)
HGB BLD-MCNC: 12.1 G/DL (ref 13.5–17.5)
MAGNESIUM SERPL-MCNC: 1.88 MG/DL (ref 1.6–2.4)
MCH RBC QN AUTO: 28 PG (ref 26–34)
MCHC RBC AUTO-ENTMCNC: 31.2 G/DL (ref 32–36)
MCV RBC AUTO: 90 FL (ref 80–100)
NRBC BLD-RTO: 0 /100 WBCS (ref 0–0)
PHOSPHATE SERPL-MCNC: 4.7 MG/DL (ref 2.5–4.9)
PLATELET # BLD AUTO: 317 X10*3/UL (ref 150–450)
POTASSIUM SERPL-SCNC: 5.5 MMOL/L (ref 3.5–5.3)
PROT UR-ACNC: 5 MG/DL (ref 5–25)
PROT/CREAT UR: 0.07 MG/MG CREAT (ref 0–0.17)
PTH-INTACT SERPL-MCNC: 143.3 PG/ML (ref 18.5–88)
RBC # BLD AUTO: 4.32 X10*6/UL (ref 4.5–5.9)
SODIUM SERPL-SCNC: 134 MMOL/L (ref 136–145)
TACROLIMUS BLD-MCNC: 9.8 NG/ML
WBC # BLD AUTO: 10.3 X10*3/UL (ref 4.4–11.3)

## 2024-05-17 RX ORDER — CHOLECALCIFEROL (VITAMIN D3) 25 MCG
2000 TABLET ORAL DAILY
Qty: 60 TABLET | Refills: 11 | Status: SHIPPED | OUTPATIENT
Start: 2024-05-17 | End: 2025-05-17

## 2024-05-17 NOTE — TELEPHONE ENCOUNTER
Reviewed labs with Dr. Marion :  [facility] 4Y 2m  K 5.5 (glucose 315)  Cr 1.38 ,1.1, 1.2, 1.4  .3  Vit D 11   (calcium 9.9)  UPC 0.07  Tac 9.8 from ?     on 1.5 mg BID  No lokelma     PLAN:   Decrease Tac to 1 mg BID   Start vit d 2,000 daily   Low K diet   Feeling ok?   Repeat labs next week     Called and spoke with patient, working on a new insulin pump feeling fine.  Verbalized understanding of plan

## 2024-06-08 DIAGNOSIS — Z94.0 KIDNEY REPLACED BY TRANSPLANT (HHS-HCC): ICD-10-CM

## 2024-06-08 RX ORDER — FUROSEMIDE 40 MG/1
40 TABLET ORAL DAILY
Qty: 90 TABLET | Refills: 3 | Status: SHIPPED | OUTPATIENT
Start: 2024-06-08 | End: 2025-06-08

## 2024-06-08 RX ORDER — TACROLIMUS 1 MG/1
1 CAPSULE ORAL 2 TIMES DAILY
Qty: 180 CAPSULE | Refills: 3 | Status: SHIPPED | OUTPATIENT
Start: 2024-06-08 | End: 2025-06-08

## 2024-08-09 ENCOUNTER — LAB (OUTPATIENT)
Dept: LAB | Facility: LAB | Age: 39
End: 2024-08-09
Payer: MEDICARE

## 2024-08-09 DIAGNOSIS — E21.3 HYPERPARATHYROIDISM (MULTI): ICD-10-CM

## 2024-08-09 DIAGNOSIS — E55.9 VITAMIN D DEFICIENCY: ICD-10-CM

## 2024-08-09 DIAGNOSIS — Z79.899 IMMUNOSUPPRESSIVE MANAGEMENT ENCOUNTER FOLLOWING KIDNEY TRANSPLANT (HHS-HCC): ICD-10-CM

## 2024-08-09 DIAGNOSIS — Z94.0 KIDNEY REPLACED BY TRANSPLANT (HHS-HCC): ICD-10-CM

## 2024-08-09 DIAGNOSIS — Z94.0 IMMUNOSUPPRESSIVE MANAGEMENT ENCOUNTER FOLLOWING KIDNEY TRANSPLANT (HHS-HCC): ICD-10-CM

## 2024-08-09 DIAGNOSIS — I10 ESSENTIAL HYPERTENSION: ICD-10-CM

## 2024-08-09 LAB
ERYTHROCYTE [DISTWIDTH] IN BLOOD BY AUTOMATED COUNT: 12.6 % (ref 11.5–14.5)
HCT VFR BLD AUTO: 40.6 % (ref 41–52)
HGB BLD-MCNC: 12.7 G/DL (ref 13.5–17.5)
MCH RBC QN AUTO: 27.5 PG (ref 26–34)
MCHC RBC AUTO-ENTMCNC: 31.3 G/DL (ref 32–36)
MCV RBC AUTO: 88 FL (ref 80–100)
NRBC BLD-RTO: 0 /100 WBCS (ref 0–0)
PLATELET # BLD AUTO: 351 X10*3/UL (ref 150–450)
RBC # BLD AUTO: 4.62 X10*6/UL (ref 4.5–5.9)
WBC # BLD AUTO: 11.9 X10*3/UL (ref 4.4–11.3)

## 2024-08-09 PROCEDURE — 83735 ASSAY OF MAGNESIUM: CPT

## 2024-08-09 PROCEDURE — 80197 ASSAY OF TACROLIMUS: CPT

## 2024-08-09 PROCEDURE — 82570 ASSAY OF URINE CREATININE: CPT

## 2024-08-09 PROCEDURE — 80069 RENAL FUNCTION PANEL: CPT

## 2024-08-09 PROCEDURE — 36415 COLL VENOUS BLD VENIPUNCTURE: CPT

## 2024-08-09 PROCEDURE — 84156 ASSAY OF PROTEIN URINE: CPT

## 2024-08-09 PROCEDURE — 83970 ASSAY OF PARATHORMONE: CPT

## 2024-08-09 PROCEDURE — 85027 COMPLETE CBC AUTOMATED: CPT

## 2024-08-09 PROCEDURE — 82306 VITAMIN D 25 HYDROXY: CPT

## 2024-08-10 LAB
25(OH)D3 SERPL-MCNC: 29 NG/ML (ref 30–100)
ALBUMIN SERPL BCP-MCNC: 4 G/DL (ref 3.4–5)
ANION GAP SERPL CALC-SCNC: 18 MMOL/L (ref 10–20)
BUN SERPL-MCNC: 30 MG/DL (ref 6–23)
CALCIUM SERPL-MCNC: 10.2 MG/DL (ref 8.6–10.6)
CHLORIDE SERPL-SCNC: 102 MMOL/L (ref 98–107)
CO2 SERPL-SCNC: 20 MMOL/L (ref 21–32)
CREAT SERPL-MCNC: 1.11 MG/DL (ref 0.5–1.3)
CREAT UR-MCNC: 55.8 MG/DL (ref 20–370)
EGFRCR SERPLBLD CKD-EPI 2021: 86 ML/MIN/1.73M*2
GLUCOSE SERPL-MCNC: 276 MG/DL (ref 74–99)
MAGNESIUM SERPL-MCNC: 1.96 MG/DL (ref 1.6–2.4)
PHOSPHATE SERPL-MCNC: 4 MG/DL (ref 2.5–4.9)
POTASSIUM SERPL-SCNC: 4.5 MMOL/L (ref 3.5–5.3)
PROT UR-ACNC: 5 MG/DL (ref 5–25)
PROT/CREAT UR: 0.09 MG/MG CREAT (ref 0–0.17)
PTH-INTACT SERPL-MCNC: 87.2 PG/ML (ref 18.5–88)
SODIUM SERPL-SCNC: 135 MMOL/L (ref 136–145)
TACROLIMUS BLD-MCNC: 9.6 NG/ML

## 2024-08-12 ENCOUNTER — TELEPHONE (OUTPATIENT)
Dept: TRANSPLANT | Facility: HOSPITAL | Age: 39
End: 2024-08-12
Payer: MEDICARE

## 2024-08-14 ENCOUNTER — TELEPHONE (OUTPATIENT)
Dept: TRANSPLANT | Facility: HOSPITAL | Age: 39
End: 2024-08-14
Payer: MEDICARE

## 2024-08-14 DIAGNOSIS — Z94.0 KIDNEY REPLACED BY TRANSPLANT (HHS-HCC): ICD-10-CM

## 2024-08-14 RX ORDER — TACROLIMUS 0.5 MG/1
0.5 CAPSULE ORAL 2 TIMES DAILY
Qty: 60 CAPSULE | Refills: 11 | Status: SHIPPED | OUTPATIENT
Start: 2024-08-14 | End: 2025-08-14

## 2024-08-14 NOTE — TELEPHONE ENCOUNTER
Reviewed labs with Dr. Delaney:  WBC 11.9 spoke with patient, no fever, no symtpoms of UTI, feels great  Tac 9.6 on 1 mg BID true level take at 10 and 10  Cr 1.1 from 1.3    PLAN:  Decrease tac to 0.5 mg BID  Repeat labs Monday or Tuesday to follow up on Tac level and WBC     Called and updated patient on plan, verbalized understanding.

## 2024-09-25 DIAGNOSIS — Z94.0 KIDNEY REPLACED BY TRANSPLANT (HHS-HCC): ICD-10-CM

## 2024-09-25 DIAGNOSIS — Z79.899 IMMUNOSUPPRESSIVE MANAGEMENT ENCOUNTER FOLLOWING KIDNEY TRANSPLANT: ICD-10-CM

## 2024-09-25 DIAGNOSIS — E21.3 HYPERPARATHYROIDISM (MULTI): ICD-10-CM

## 2024-09-25 DIAGNOSIS — E55.9 VITAMIN D DEFICIENCY: ICD-10-CM

## 2024-09-25 DIAGNOSIS — Z94.0 IMMUNOSUPPRESSIVE MANAGEMENT ENCOUNTER FOLLOWING KIDNEY TRANSPLANT: ICD-10-CM

## 2024-09-25 DIAGNOSIS — I10 ESSENTIAL HYPERTENSION: ICD-10-CM

## 2024-10-09 ENCOUNTER — TELEPHONE (OUTPATIENT)
Dept: TRANSPLANT | Facility: CLINIC | Age: 39
End: 2024-10-09

## 2024-10-09 NOTE — TELEPHONE ENCOUNTER
Pt no showed his appt, 10/9. Rescheduled him      [facility] at 11:20 AM (20 min)  Wednesday October 30, 2024  Appointment Provider:[facility] [place] [address] in [place] [address] [facility]

## 2024-10-30 ENCOUNTER — APPOINTMENT (OUTPATIENT)
Dept: TRANSPLANT | Facility: CLINIC | Age: 39
End: 2024-10-30
Payer: MEDICARE

## 2024-12-03 ENCOUNTER — TELEPHONE (OUTPATIENT)
Dept: TRANSPLANT | Facility: HOSPITAL | Age: 39
End: 2024-12-03
Payer: MEDICARE

## 2024-12-04 ENCOUNTER — TELEPHONE (OUTPATIENT)
Dept: TRANSPLANT | Facility: HOSPITAL | Age: 39
End: 2024-12-04
Payer: MEDICARE

## 2024-12-04 NOTE — TELEPHONE ENCOUNTER
PATIENT LETTER WAS MARKED AS RETURN TO SENDER   CALLED PATIENT TO INFORM ABOUT [facility]PT [facility]  THERE WAS NO ANSWER, COULD NOT LEAVE VOICEMAIL

## 2024-12-05 NOTE — TELEPHONE ENCOUNTER
PATIENT LETTER WAS MARKED AS RETURN TO SENDER   CALLED PATIENT TO INFORM ABOUT [facility] M[facility]  THERE WAS NO ANSWER, LEFT A VOICMAIL WITH ALL INFO    THIS IS THE THIRD AND FINAL CALL

## 2024-12-13 ENCOUNTER — LAB (OUTPATIENT)
Dept: LAB | Facility: LAB | Age: 39
End: 2024-12-13
Payer: MEDICARE

## 2024-12-13 DIAGNOSIS — E55.9 VITAMIN D DEFICIENCY: ICD-10-CM

## 2024-12-13 DIAGNOSIS — I10 ESSENTIAL HYPERTENSION: ICD-10-CM

## 2024-12-13 DIAGNOSIS — Z94.0 IMMUNOSUPPRESSIVE MANAGEMENT ENCOUNTER FOLLOWING KIDNEY TRANSPLANT: ICD-10-CM

## 2024-12-13 DIAGNOSIS — E78.9 LIPID DISORDER: ICD-10-CM

## 2024-12-13 DIAGNOSIS — Z79.899 IMMUNOSUPPRESSIVE MANAGEMENT ENCOUNTER FOLLOWING KIDNEY TRANSPLANT: ICD-10-CM

## 2024-12-13 DIAGNOSIS — E11.21 DIABETES MELLITUS WITH KIDNEY DISEASE (MULTI): ICD-10-CM

## 2024-12-13 DIAGNOSIS — E21.3 HYPERPARATHYROIDISM (MULTI): ICD-10-CM

## 2024-12-13 DIAGNOSIS — Z94.0 KIDNEY REPLACED BY TRANSPLANT (HHS-HCC): ICD-10-CM

## 2024-12-13 LAB
25(OH)D3 SERPL-MCNC: 32 NG/ML (ref 30–100)
ALBUMIN SERPL BCP-MCNC: 3.9 G/DL (ref 3.4–5)
ANION GAP SERPL CALC-SCNC: 15 MMOL/L (ref 10–20)
BUN SERPL-MCNC: 20 MG/DL (ref 6–23)
CALCIUM SERPL-MCNC: 10 MG/DL (ref 8.6–10.6)
CHLORIDE SERPL-SCNC: 99 MMOL/L (ref 98–107)
CHOLEST SERPL-MCNC: 147 MG/DL (ref 0–199)
CHOLESTEROL/HDL RATIO: 4
CO2 SERPL-SCNC: 26 MMOL/L (ref 21–32)
CREAT SERPL-MCNC: 1.29 MG/DL (ref 0.5–1.3)
CREAT UR-MCNC: 74.3 MG/DL (ref 20–370)
EGFRCR SERPLBLD CKD-EPI 2021: 71 ML/MIN/1.73M*2
ERYTHROCYTE [DISTWIDTH] IN BLOOD BY AUTOMATED COUNT: 13 % (ref 11.5–14.5)
EST. AVERAGE GLUCOSE BLD GHB EST-MCNC: 283 MG/DL
GLUCOSE SERPL-MCNC: 282 MG/DL (ref 74–99)
HBA1C MFR BLD: 11.5 %
HCT VFR BLD AUTO: 42.2 % (ref 41–52)
HDLC SERPL-MCNC: 37.1 MG/DL
HGB BLD-MCNC: 12.9 G/DL (ref 13.5–17.5)
LDLC SERPL CALC-MCNC: 83 MG/DL
MAGNESIUM SERPL-MCNC: 2.01 MG/DL (ref 1.6–2.4)
MCH RBC QN AUTO: 27.6 PG (ref 26–34)
MCHC RBC AUTO-ENTMCNC: 30.6 G/DL (ref 32–36)
MCV RBC AUTO: 90 FL (ref 80–100)
NON HDL CHOLESTEROL: 110 MG/DL (ref 0–149)
NRBC BLD-RTO: 0 /100 WBCS (ref 0–0)
PHOSPHATE SERPL-MCNC: 4 MG/DL (ref 2.5–4.9)
PLATELET # BLD AUTO: 362 X10*3/UL (ref 150–450)
POTASSIUM SERPL-SCNC: 5.2 MMOL/L (ref 3.5–5.3)
PROT UR-ACNC: 5 MG/DL (ref 5–25)
PROT/CREAT UR: 0.07 MG/MG CREAT (ref 0–0.17)
PTH-INTACT SERPL-MCNC: 139.5 PG/ML (ref 18.5–88)
RBC # BLD AUTO: 4.68 X10*6/UL (ref 4.5–5.9)
SODIUM SERPL-SCNC: 135 MMOL/L (ref 136–145)
TACROLIMUS BLD-MCNC: 5.5 NG/ML
TRIGL SERPL-MCNC: 135 MG/DL (ref 0–149)
VLDL: 27 MG/DL (ref 0–40)
WBC # BLD AUTO: 11.7 X10*3/UL (ref 4.4–11.3)

## 2024-12-13 PROCEDURE — 80061 LIPID PANEL: CPT

## 2024-12-13 PROCEDURE — 36415 COLL VENOUS BLD VENIPUNCTURE: CPT

## 2024-12-13 PROCEDURE — 83036 HEMOGLOBIN GLYCOSYLATED A1C: CPT

## 2024-12-13 PROCEDURE — 85027 COMPLETE CBC AUTOMATED: CPT

## 2024-12-13 PROCEDURE — 84156 ASSAY OF PROTEIN URINE: CPT

## 2024-12-13 PROCEDURE — 80069 RENAL FUNCTION PANEL: CPT

## 2024-12-13 PROCEDURE — 83970 ASSAY OF PARATHORMONE: CPT

## 2024-12-13 PROCEDURE — 80197 ASSAY OF TACROLIMUS: CPT

## 2024-12-13 PROCEDURE — 82570 ASSAY OF URINE CREATININE: CPT

## 2024-12-13 PROCEDURE — 82306 VITAMIN D 25 HYDROXY: CPT

## 2024-12-13 PROCEDURE — 83735 ASSAY OF MAGNESIUM: CPT

## 2024-12-16 ENCOUNTER — TELEPHONE (OUTPATIENT)
Dept: TRANSPLANT | Facility: HOSPITAL | Age: 39
End: 2024-12-16
Payer: MEDICARE

## 2024-12-16 DIAGNOSIS — Z94.0 KIDNEY REPLACED BY TRANSPLANT (HHS-HCC): ICD-10-CM

## 2024-12-16 DIAGNOSIS — R39.9 UTI SYMPTOMS: ICD-10-CM

## 2024-12-16 NOTE — TELEPHONE ENCOUNTER
WBC 11.7  Patient recently in ED for SIDDHARTHA, dehydration  Called and LVM asked for call back to see how feeling and if having any UTI symptoms?

## 2024-12-16 NOTE — TELEPHONE ENCOUNTER
Call returned to patient, will drop off urine, denies illness, fevers, cough.     
Patient called requesting to speak with coordinator about a voicemail crissy left for him about his recent ER visit.  Patient call back number is [phone].   
Female

## 2024-12-24 ENCOUNTER — TELEPHONE (OUTPATIENT)
Dept: TRANSPLANT | Facility: HOSPITAL | Age: 39
End: 2024-12-24
Payer: MEDICARE

## 2024-12-24 NOTE — TELEPHONE ENCOUNTER
Patient called requesting to speak with coordinator about  his WBC. Its been elevated at his two most recent visits. Wasn't sure of the numbers off the top of his head. Abscess' maryam his left leg/hip. Concerned. Informed him Francy was reviewing with a  And will call back when she is able .  Patient call back number is [phone] .

## 2024-12-26 NOTE — TELEPHONE ENCOUNTER
Returned call to patient, LVM patient was supposed to drop off urine to rule out a UTI, has not been completed. Asked for call back to discuss further.

## 2025-02-21 PROCEDURE — 81003 URINALYSIS AUTO W/O SCOPE: CPT

## 2025-02-22 ENCOUNTER — LAB (OUTPATIENT)
Dept: LAB | Facility: HOSPITAL | Age: 40
End: 2025-02-22
Payer: MEDICARE

## 2025-02-22 DIAGNOSIS — R39.9 UTI SYMPTOMS: ICD-10-CM

## 2025-02-22 DIAGNOSIS — Z94.0 KIDNEY REPLACED BY TRANSPLANT (HHS-HCC): ICD-10-CM

## 2025-02-22 LAB
APPEARANCE UR: CLEAR
BILIRUB UR STRIP.AUTO-MCNC: NEGATIVE MG/DL
COLOR UR: ABNORMAL
GLUCOSE UR STRIP.AUTO-MCNC: ABNORMAL MG/DL
HOLD SPECIMEN: NORMAL
KETONES UR STRIP.AUTO-MCNC: NEGATIVE MG/DL
LEUKOCYTE ESTERASE UR QL STRIP.AUTO: NEGATIVE
NITRITE UR QL STRIP.AUTO: NEGATIVE
PH UR STRIP.AUTO: 5.5 [PH]
PROT UR STRIP.AUTO-MCNC: NEGATIVE MG/DL
RBC # UR STRIP.AUTO: NEGATIVE MG/DL
SP GR UR STRIP.AUTO: 1.01
UROBILINOGEN UR STRIP.AUTO-MCNC: NORMAL MG/DL

## 2025-03-19 PROCEDURE — 85027 COMPLETE CBC AUTOMATED: CPT

## 2025-03-19 PROCEDURE — 82306 VITAMIN D 25 HYDROXY: CPT

## 2025-03-19 PROCEDURE — 83735 ASSAY OF MAGNESIUM: CPT

## 2025-03-19 PROCEDURE — 80069 RENAL FUNCTION PANEL: CPT

## 2025-03-19 PROCEDURE — 82570 ASSAY OF URINE CREATININE: CPT

## 2025-03-19 PROCEDURE — 80197 ASSAY OF TACROLIMUS: CPT

## 2025-03-19 PROCEDURE — 84156 ASSAY OF PROTEIN URINE: CPT

## 2025-03-19 PROCEDURE — 36415 COLL VENOUS BLD VENIPUNCTURE: CPT

## 2025-03-19 PROCEDURE — 83970 ASSAY OF PARATHORMONE: CPT

## 2025-03-20 ENCOUNTER — LAB (OUTPATIENT)
Dept: LAB | Facility: HOSPITAL | Age: 40
End: 2025-03-20
Payer: MEDICARE

## 2025-03-20 ENCOUNTER — TELEPHONE (OUTPATIENT)
Facility: HOSPITAL | Age: 40
End: 2025-03-20
Payer: MEDICARE

## 2025-03-20 DIAGNOSIS — Z94.0 IMMUNOSUPPRESSIVE MANAGEMENT ENCOUNTER FOLLOWING KIDNEY TRANSPLANT: ICD-10-CM

## 2025-03-20 DIAGNOSIS — E55.9 VITAMIN D DEFICIENCY, UNSPECIFIED: ICD-10-CM

## 2025-03-20 DIAGNOSIS — Z79.899 IMMUNOSUPPRESSIVE MANAGEMENT ENCOUNTER FOLLOWING KIDNEY TRANSPLANT: ICD-10-CM

## 2025-03-20 DIAGNOSIS — I10 ESSENTIAL HYPERTENSION: ICD-10-CM

## 2025-03-20 DIAGNOSIS — Z94.0 KIDNEY REPLACED BY TRANSPLANT (HHS-HCC): ICD-10-CM

## 2025-03-20 DIAGNOSIS — E21.3 HYPERPARATHYROIDISM (MULTI): ICD-10-CM

## 2025-03-20 DIAGNOSIS — Z94.0 KIDNEY TRANSPLANT STATUS: Primary | ICD-10-CM

## 2025-03-20 DIAGNOSIS — E55.9 VITAMIN D DEFICIENCY: ICD-10-CM

## 2025-03-20 DIAGNOSIS — I10 ESSENTIAL (PRIMARY) HYPERTENSION: ICD-10-CM

## 2025-03-20 DIAGNOSIS — Z79.899 OTHER LONG TERM (CURRENT) DRUG THERAPY: ICD-10-CM

## 2025-03-20 DIAGNOSIS — E21.3 HYPERPARATHYROIDISM, UNSPECIFIED (MULTI): ICD-10-CM

## 2025-03-20 LAB
25(OH)D3 SERPL-MCNC: 37 NG/ML (ref 30–100)
ALBUMIN SERPL BCP-MCNC: 4 G/DL (ref 3.4–5)
ANION GAP SERPL CALC-SCNC: 15 MMOL/L (ref 10–20)
BUN SERPL-MCNC: 21 MG/DL (ref 6–23)
CALCIUM SERPL-MCNC: 10.1 MG/DL (ref 8.6–10.6)
CHLORIDE SERPL-SCNC: 97 MMOL/L (ref 98–107)
CO2 SERPL-SCNC: 26 MMOL/L (ref 21–32)
CREAT SERPL-MCNC: 1.4 MG/DL (ref 0.5–1.3)
CREAT UR-MCNC: 79.9 MG/DL (ref 20–370)
EGFRCR SERPLBLD CKD-EPI 2021: 65 ML/MIN/1.73M*2
ERYTHROCYTE [DISTWIDTH] IN BLOOD BY AUTOMATED COUNT: 13.3 % (ref 11.5–14.5)
GLUCOSE SERPL-MCNC: 299 MG/DL (ref 74–99)
HCT VFR BLD AUTO: 42 % (ref 41–52)
HGB BLD-MCNC: 12.8 G/DL (ref 13.5–17.5)
MAGNESIUM SERPL-MCNC: 2.11 MG/DL (ref 1.6–2.4)
MCH RBC QN AUTO: 27.4 PG (ref 26–34)
MCHC RBC AUTO-ENTMCNC: 30.5 G/DL (ref 32–36)
MCV RBC AUTO: 90 FL (ref 80–100)
NRBC BLD-RTO: 0 /100 WBCS (ref 0–0)
PHOSPHATE SERPL-MCNC: 4.3 MG/DL (ref 2.5–4.9)
PLATELET # BLD AUTO: 337 X10*3/UL (ref 150–450)
POTASSIUM SERPL-SCNC: 4.9 MMOL/L (ref 3.5–5.3)
PROT UR-ACNC: 5 MG/DL (ref 5–25)
PROT/CREAT UR: 0.06 MG/MG CREAT (ref 0–0.17)
PTH-INTACT SERPL-MCNC: 123.9 PG/ML (ref 18.5–88)
RBC # BLD AUTO: 4.68 X10*6/UL (ref 4.5–5.9)
SODIUM SERPL-SCNC: 133 MMOL/L (ref 136–145)
TACROLIMUS BLD-MCNC: 3.7 NG/ML
WBC # BLD AUTO: 9.4 X10*3/UL (ref 4.4–11.3)

## 2025-03-20 NOTE — TELEPHONE ENCOUNTER
Reviewed labs with Dr. Delaney:   glucose 299 ( has wound)   na 133  cr 1.4 from 1.2   tac 3.7  (not true level lab at noon)  PLAN:   repeat labs with true level for tacrolimus and  get glucose under control    Direct message sent to patient with plan

## 2025-03-27 ENCOUNTER — LAB (OUTPATIENT)
Dept: LAB | Facility: HOSPITAL | Age: 40
End: 2025-03-27
Payer: MEDICARE

## 2025-03-27 DIAGNOSIS — Z94.0 KIDNEY TRANSPLANT STATUS: Primary | ICD-10-CM

## 2025-03-27 LAB
ALBUMIN SERPL BCP-MCNC: 4.1 G/DL (ref 3.4–5)
ANION GAP SERPL CALC-SCNC: 15 MMOL/L (ref 10–20)
BUN SERPL-MCNC: 21 MG/DL (ref 6–23)
CALCIUM SERPL-MCNC: 9.9 MG/DL (ref 8.6–10.6)
CHLORIDE SERPL-SCNC: 99 MMOL/L (ref 98–107)
CO2 SERPL-SCNC: 23 MMOL/L (ref 21–32)
CREAT SERPL-MCNC: 1.23 MG/DL (ref 0.5–1.3)
EGFRCR SERPLBLD CKD-EPI 2021: 76 ML/MIN/1.73M*2
ERYTHROCYTE [DISTWIDTH] IN BLOOD BY AUTOMATED COUNT: 13 % (ref 11.5–14.5)
GLUCOSE SERPL-MCNC: 396 MG/DL (ref 74–99)
HCT VFR BLD AUTO: 42.4 % (ref 41–52)
HGB BLD-MCNC: 13.4 G/DL (ref 13.5–17.5)
MCH RBC QN AUTO: 28.3 PG (ref 26–34)
MCHC RBC AUTO-ENTMCNC: 31.6 G/DL (ref 32–36)
MCV RBC AUTO: 90 FL (ref 80–100)
NRBC BLD-RTO: 0 /100 WBCS (ref 0–0)
PHOSPHATE SERPL-MCNC: 4.2 MG/DL (ref 2.5–4.9)
PLATELET # BLD AUTO: 325 X10*3/UL (ref 150–450)
POTASSIUM SERPL-SCNC: 4.9 MMOL/L (ref 3.5–5.3)
RBC # BLD AUTO: 4.73 X10*6/UL (ref 4.5–5.9)
SODIUM SERPL-SCNC: 132 MMOL/L (ref 136–145)
TACROLIMUS BLD-MCNC: 6.3 NG/ML
WBC # BLD AUTO: 10.7 X10*3/UL (ref 4.4–11.3)

## 2025-03-27 PROCEDURE — 80197 ASSAY OF TACROLIMUS: CPT

## 2025-03-27 PROCEDURE — 85027 COMPLETE CBC AUTOMATED: CPT

## 2025-03-27 PROCEDURE — 80069 RENAL FUNCTION PANEL: CPT

## 2025-03-28 ENCOUNTER — TELEPHONE (OUTPATIENT)
Facility: HOSPITAL | Age: 40
End: 2025-03-28
Payer: MEDICARE

## 2025-03-28 NOTE — TELEPHONE ENCOUNTER
Labs reviewed with Dr michaud  Glucose 396  Plan: ask pt to move up endo appt    Called pt with plan: Pt states on wait list for sooner endo appt- pt gets pump supplies and needs to be seen q 3 months for supplies to get covered by insurance- pt states he may do a virtual if he can't be seen. Pt also asked to discuss cr and tac. NFQ at this time

## 2025-04-01 ENCOUNTER — TELEPHONE (OUTPATIENT)
Facility: HOSPITAL | Age: 40
End: 2025-04-01
Payer: MEDICARE

## 2025-04-01 DIAGNOSIS — Z94.0 KIDNEY REPLACED BY TRANSPLANT (HHS-HCC): ICD-10-CM

## 2025-04-01 NOTE — TELEPHONE ENCOUNTER
Patient called requesting to speak with coordinator about .  Went ed last night he wasn't feeling well fatigue and headache and jittery his sugars were elevated. He says he still doesn't feel well. The labs are in the system can someone go over this with the doctor and follow up with patient.  Patient creatinine is elevated 1.31  Glucose is high 332  Alt  is elevated at 51    Patient call back number is   [phone]

## 2025-04-01 NOTE — TELEPHONE ENCOUNTER
Returned pts call. Pt went to ER last night  Pt jittery/ headache/ didn't feel right  Got Labs / fluid  Dx Dehydration- discharged home  Follow up endo appt scheduled    Advised pt to hydrate well and get repeat labs at the end of this week. Advised to call back if symptoms worsen.     Pt agreeable to plan and will get repeat labs Friday

## 2025-04-02 ENCOUNTER — TELEPHONE (OUTPATIENT)
Facility: HOSPITAL | Age: 40
End: 2025-04-02
Payer: MEDICARE

## 2025-04-02 NOTE — TELEPHONE ENCOUNTER
Call returned to patient to let him know we cannot fill his Amitriptyline . Patient states he got an appt tomorrow with PCP.

## 2025-04-02 NOTE — TELEPHONE ENCOUNTER
Patient paged call phone last night at 2230.  Explained that he went to the ER yesterday (Monday) for c/o headache, fatigue, feeling jittery and dizzy.  Patient stated that he was given fluids , had blood work and urine tested.  Patient thinks that some of these symptoms are related to not taking his Amitriptyline.  Patient stated that he has been out of this medication for about 1 week. He has no refills.  He is not sure who prescribes this medication but has an appointment with his PCP on Thurs.  Patient would like to know if transplant can prescribe him a couple of doses until he can get another prescription.  I told him that I would pass this along to the post transplant coordinators.  Patient will also call [facility] pharmacy in [place] to see if they can give him a couple doses.  Explained the importance of not letting medications run out.  Patient expressed understanding.

## 2025-04-10 ENCOUNTER — TELEPHONE (OUTPATIENT)
Facility: HOSPITAL | Age: 40
End: 2025-04-10
Payer: MEDICARE

## 2025-04-11 NOTE — TELEPHONE ENCOUNTER
Called patient back, rec VM, LVM advising normally transplant doesn't manage that medication long term and he would need to reach out to his PCP for refills.

## 2025-04-18 ENCOUNTER — TELEPHONE (OUTPATIENT)
Facility: HOSPITAL | Age: 40
End: 2025-04-18
Payer: MEDICARE

## 2025-04-18 DIAGNOSIS — R60.9 EDEMA, UNSPECIFIED TYPE: ICD-10-CM

## 2025-04-18 DIAGNOSIS — I15.1 HYPERTENSION SECONDARY TO OTHER RENAL DISORDERS: ICD-10-CM

## 2025-04-18 DIAGNOSIS — Z94.0 KIDNEY REPLACED BY TRANSPLANT (HHS-HCC): ICD-10-CM

## 2025-04-18 NOTE — TELEPHONE ENCOUNTER
Patient called [facility] personal line needing refills.   Call placed to patient, gave patient TI phone number states he was just calling the main hospital number.   Refills for pred, lasix and lisinopril to [facility].

## 2025-04-21 ENCOUNTER — PATIENT MESSAGE (OUTPATIENT)
Facility: HOSPITAL | Age: 40
End: 2025-04-21
Payer: MEDICARE

## 2025-04-21 DIAGNOSIS — Z94.0 KIDNEY REPLACED BY TRANSPLANT (HHS-HCC): ICD-10-CM

## 2025-04-21 DIAGNOSIS — I15.1 HYPERTENSION SECONDARY TO OTHER RENAL DISORDERS: ICD-10-CM

## 2025-04-21 RX ORDER — FUROSEMIDE 40 MG/1
40 TABLET ORAL DAILY
Qty: 90 TABLET | Refills: 3 | Status: SHIPPED | OUTPATIENT
Start: 2025-04-21 | End: 2026-04-21

## 2025-04-21 RX ORDER — PREDNISONE 5 MG/1
5 TABLET ORAL DAILY
Qty: 90 TABLET | Refills: 3 | Status: SHIPPED | OUTPATIENT
Start: 2025-04-21 | End: 2026-04-21

## 2025-04-21 RX ORDER — LISINOPRIL 20 MG/1
20 TABLET ORAL DAILY
Qty: 90 TABLET | Refills: 3 | Status: SHIPPED | OUTPATIENT
Start: 2025-04-21 | End: 2026-04-21

## 2025-04-21 NOTE — TELEPHONE ENCOUNTER
Patient called to request refills of the below medication(s) and dose(s).  Please send prescription for the checked items below to       [facility] [address] [place] [address] - [address]  [address] [facility] [address]  Phone: [phone] Fax: [phone]         furosemide (Lasix) 40 mg tablet   carvedilol (Coreg) 25 mg tablet    lisinopril 20 mg tablet   mycophenolate (Cellcept) 250 mg capsule   predniSONE (Deltasone) 5 mg tablet

## 2025-04-23 RX ORDER — CARVEDILOL 25 MG/1
25 TABLET ORAL 2 TIMES DAILY
Qty: 180 TABLET | Refills: 3 | Status: SHIPPED | OUTPATIENT
Start: 2025-04-23 | End: 2026-04-23

## 2025-04-23 RX ORDER — MYCOPHENOLATE MOFETIL 250 MG/1
750 CAPSULE ORAL 2 TIMES DAILY
Qty: 540 CAPSULE | Refills: 3 | Status: SHIPPED | OUTPATIENT
Start: 2025-04-23 | End: 2026-04-23

## 2025-05-08 ENCOUNTER — PATIENT MESSAGE (OUTPATIENT)
Facility: HOSPITAL | Age: 40
End: 2025-05-08
Payer: MEDICARE

## 2025-05-08 DIAGNOSIS — Z94.0 KIDNEY REPLACED BY TRANSPLANT (HHS-HCC): ICD-10-CM

## 2025-06-27 ENCOUNTER — LAB (OUTPATIENT)
Dept: LAB | Facility: HOSPITAL | Age: 40
End: 2025-06-27
Payer: MEDICARE

## 2025-06-27 DIAGNOSIS — Z79.899 IMMUNOSUPPRESSIVE MANAGEMENT ENCOUNTER FOLLOWING KIDNEY TRANSPLANT: ICD-10-CM

## 2025-06-27 DIAGNOSIS — Z94.0 IMMUNOSUPPRESSIVE MANAGEMENT ENCOUNTER FOLLOWING KIDNEY TRANSPLANT: ICD-10-CM

## 2025-06-27 DIAGNOSIS — Z94.0 KIDNEY REPLACED BY TRANSPLANT (HHS-HCC): ICD-10-CM

## 2025-06-27 DIAGNOSIS — E21.3 HYPERPARATHYROIDISM (MULTI): ICD-10-CM

## 2025-06-27 DIAGNOSIS — I10 ESSENTIAL HYPERTENSION: ICD-10-CM

## 2025-06-27 DIAGNOSIS — Z94.0 KIDNEY TRANSPLANT STATUS: Primary | ICD-10-CM

## 2025-06-27 DIAGNOSIS — E55.9 VITAMIN D DEFICIENCY: ICD-10-CM

## 2025-06-27 LAB
25(OH)D3 SERPL-MCNC: 23 NG/ML (ref 30–100)
ALBUMIN SERPL BCP-MCNC: 3.7 G/DL (ref 3.4–5)
ANION GAP SERPL CALC-SCNC: 14 MMOL/L (ref 10–20)
BUN SERPL-MCNC: 34 MG/DL (ref 6–23)
CALCIUM SERPL-MCNC: 10.1 MG/DL (ref 8.6–10.6)
CHLORIDE SERPL-SCNC: 102 MMOL/L (ref 98–107)
CO2 SERPL-SCNC: 25 MMOL/L (ref 21–32)
CREAT SERPL-MCNC: 1.31 MG/DL (ref 0.5–1.3)
CREAT UR-MCNC: 59.7 MG/DL (ref 20–370)
EGFRCR SERPLBLD CKD-EPI 2021: 70 ML/MIN/1.73M*2
ERYTHROCYTE [DISTWIDTH] IN BLOOD BY AUTOMATED COUNT: 13.2 % (ref 11.5–14.5)
GLUCOSE SERPL-MCNC: 148 MG/DL (ref 74–99)
HCT VFR BLD AUTO: 42.1 % (ref 41–52)
HGB BLD-MCNC: 12.9 G/DL (ref 13.5–17.5)
MAGNESIUM SERPL-MCNC: 1.97 MG/DL (ref 1.6–2.4)
MCH RBC QN AUTO: 27.8 PG (ref 26–34)
MCHC RBC AUTO-ENTMCNC: 30.6 G/DL (ref 32–36)
MCV RBC AUTO: 91 FL (ref 80–100)
NRBC BLD-RTO: 0 /100 WBCS (ref 0–0)
PHOSPHATE SERPL-MCNC: 4.2 MG/DL (ref 2.5–4.9)
PLATELET # BLD AUTO: 369 X10*3/UL (ref 150–450)
POTASSIUM SERPL-SCNC: 5.2 MMOL/L (ref 3.5–5.3)
PROT UR-ACNC: <4 MG/DL (ref 5–25)
PROT/CREAT UR: ABNORMAL MG/G{CREAT}
PTH-INTACT SERPL-MCNC: 55.3 PG/ML (ref 18.5–88)
RBC # BLD AUTO: 4.64 X10*6/UL (ref 4.5–5.9)
SODIUM SERPL-SCNC: 136 MMOL/L (ref 136–145)
TACROLIMUS BLD-MCNC: 4.1 NG/ML
WBC # BLD AUTO: 10.2 X10*3/UL (ref 4.4–11.3)

## 2025-06-27 PROCEDURE — 83970 ASSAY OF PARATHORMONE: CPT

## 2025-06-27 PROCEDURE — 84156 ASSAY OF PROTEIN URINE: CPT

## 2025-06-27 PROCEDURE — 80197 ASSAY OF TACROLIMUS: CPT

## 2025-06-27 PROCEDURE — 82570 ASSAY OF URINE CREATININE: CPT

## 2025-06-27 PROCEDURE — 83735 ASSAY OF MAGNESIUM: CPT

## 2025-06-27 PROCEDURE — 36415 COLL VENOUS BLD VENIPUNCTURE: CPT

## 2025-06-27 PROCEDURE — 80069 RENAL FUNCTION PANEL: CPT

## 2025-06-27 PROCEDURE — 82306 VITAMIN D 25 HYDROXY: CPT

## 2025-06-27 PROCEDURE — 85027 COMPLETE CBC AUTOMATED: CPT

## 2025-07-30 ENCOUNTER — OFFICE VISIT (OUTPATIENT)
Facility: HOSPITAL | Age: 40
End: 2025-07-30
Payer: MEDICARE

## 2025-07-30 VITALS
BODY MASS INDEX: 37.45 KG/M2 | TEMPERATURE: 96.5 F | OXYGEN SATURATION: 97 % | HEART RATE: 92 BPM | WEIGHT: 232 LBS | DIASTOLIC BLOOD PRESSURE: 61 MMHG | SYSTOLIC BLOOD PRESSURE: 148 MMHG

## 2025-07-30 DIAGNOSIS — M79.642 LEFT HAND PAIN: ICD-10-CM

## 2025-07-30 DIAGNOSIS — Z94.0 KIDNEY REPLACED BY TRANSPLANT (HHS-HCC): ICD-10-CM

## 2025-07-30 DIAGNOSIS — I73.9 PAD (PERIPHERAL ARTERY DISEASE): ICD-10-CM

## 2025-07-30 DIAGNOSIS — N18.6 ESRD (END STAGE RENAL DISEASE) (MULTI): Primary | ICD-10-CM

## 2025-07-30 PROCEDURE — 4010F ACE/ARB THERAPY RXD/TAKEN: CPT | Performed by: HOSPITALIST

## 2025-07-30 PROCEDURE — 3077F SYST BP >= 140 MM HG: CPT | Performed by: HOSPITALIST

## 2025-07-30 PROCEDURE — 3078F DIAST BP <80 MM HG: CPT | Performed by: HOSPITALIST

## 2025-07-30 PROCEDURE — 99215 OFFICE O/P EST HI 40 MIN: CPT | Performed by: HOSPITALIST

## 2025-07-30 RX ORDER — TACROLIMUS 1 MG/1
1 CAPSULE ORAL DAILY
Qty: 30 CAPSULE | Refills: 11 | Status: SHIPPED | OUTPATIENT
Start: 2025-07-30 | End: 2026-07-30

## 2025-07-30 RX ORDER — TACROLIMUS 0.5 MG/1
0.5 CAPSULE ORAL DAILY
Qty: 30 CAPSULE | Refills: 11 | Status: SHIPPED | OUTPATIENT
Start: 2025-07-30 | End: 2026-07-30

## 2025-07-30 ASSESSMENT — ENCOUNTER SYMPTOMS
HEMATURIA: 0
DIARRHEA: 0
LOSS OF SENSATION IN FEET: 0
CHEST TIGHTNESS: 0
ABDOMINAL DISTENTION: 0
FREQUENCY: 0
VOMITING: 0
CONFUSION: 0
NERVOUS/ANXIOUS: 0
HEADACHES: 0
PALPITATIONS: 0
OCCASIONAL FEELINGS OF UNSTEADINESS: 0
NAUSEA: 0
DEPRESSION: 0
BACK PAIN: 0
COUGH: 0
SHORTNESS OF BREATH: 0

## 2025-07-30 ASSESSMENT — PATIENT HEALTH QUESTIONNAIRE - PHQ9
SUM OF ALL RESPONSES TO PHQ9 QUESTIONS 1 & 2: 0
2. FEELING DOWN, DEPRESSED OR HOPELESS: NOT AT ALL
1. LITTLE INTEREST OR PLEASURE IN DOING THINGS: NOT AT ALL

## 2025-07-30 ASSESSMENT — LIFESTYLE VARIABLES
HOW OFTEN DO YOU HAVE SIX OR MORE DRINKS ON ONE OCCASION: NEVER
HOW OFTEN DO YOU HAVE A DRINK CONTAINING ALCOHOL: NEVER
SKIP TO QUESTIONS 9-10: 1
HOW MANY STANDARD DRINKS CONTAINING ALCOHOL DO YOU HAVE ON A TYPICAL DAY: PATIENT DOES NOT DRINK
AUDIT-C TOTAL SCORE: 0

## 2025-07-30 NOTE — PROGRESS NOTES
Erickson Hurlock  40 y.o. male with past medical history significant for seizure disorder, HLD, HTN, ESRD secondary to type 1 diabetes ( on insulin pump) s/p a  kidney transplant on 2020,( thymo Induction and HCV positive. The patient converted to HCV positive on 3/4/2020 s/p treatment.) diabetic retinopathy, diabetic wounds s/p L toe amputation.   -Hospitalization - 2024 s/p left BKA.  - ED visit as of 2025.  For right fourth digit  injury.  He completed doxycycline he also underwent vascular ultrasound at [facility]-showing right small vessel disease versus vasoconstriction and the left small vessel disease versus vasoconstriction.    Interim history: Still complaining of pain in the right ring finger-looks like having dry ischemic injury versus gangrene at the tip.        Review of Systems   Respiratory:  Negative for cough, chest tightness and shortness of breath.    Cardiovascular:  Negative for chest pain, palpitations and leg swelling.   Gastrointestinal:  Negative for abdominal distention, diarrhea, nausea and vomiting.   Genitourinary:  Negative for frequency, hematuria and urgency.   Musculoskeletal:  Negative for back pain.   Neurological:  Negative for headaches.   Psychiatric/Behavioral:  Negative for confusion. The patient is not nervous/anxious.         Objective:  Visit Vitals  /61 (BP Location: Right arm, Patient Position: Sitting, BP Cuff Size: Adult)   Pulse 92   Temp 35.8 °C (96.5 °F) (Temporal)   Wt 105 kg (232 lb)   SpO2 97%   BMI 37.45 kg/m²   Smoking Status Never   BSA 2.21 m²      Physical Exam  HENT:      Head: Normocephalic and atraumatic.      Nose: Nose normal.      Mouth/Throat:      Mouth: Mucous membranes are moist.     Eyes:      Extraocular Movements: Extraocular movements intact.      Pupils: Pupils are equal, round, and reactive to light.       Cardiovascular:      Rate and Rhythm: Normal rate.      Pulses: Normal pulses.      Heart sounds: Normal heart sounds.    Pulmonary:      Effort: Pulmonary effort is normal.   Abdominal:      Palpations: Abdomen is soft.      Tenderness: There is no abdominal tenderness. There is no guarding.     Musculoskeletal:         General: Normal range of motion.      Cervical back: Normal range of motion.     Skin:     General: Skin is warm.     Neurological:      General: No focal deficit present.      Mental Status: Mental status is at baseline.     Psychiatric:         Mood and Affect: Mood normal.          Current Medications[1]     [unfilled]     No images are attached to the encounter.     Assessment and Plan :Erickson Ware 40 y.o. male with past medical history significant for seizure disorder, HLD, HTN, ESRD secondary to type 1 diabetes ( on insulin pump) s/p a  kidney transplant on 2020,( thymo Induction and HCV positive. The patient converted to HCV positive on 3/4/2020 s/p treatment.) diabetic retinopathy, diabetic wounds s/p L toe amputation.   -Hospitalization - 2024 s/p left BKA.  - ED visit as of 2025.  For right fourth digit  injury.  He completed doxycycline he also underwent vascular ultrasound at [facility]-showing right small vessel disease versus vasoconstriction and the left small vessel disease versus vasoconstriction.    Interim history: Still complaining of pain in the right ring finger-looks like having dry ischemic injury versus gangrene at the tip.    Allograft function: Stable creatinine in the range of 1.2-1.3, mild hyperkalemia starting Lokelma 5 g as the patient is on lisinopril.  Last UPC is less than 4.  Will continue to monitor closely.    Immunosuppression: Continue with the prednisone 5 mg, mycophenolate 3 capsules twice daily and increase tacrolimus to 1 mg in the morning and 0.5 mg in the evening.  Aim tacrolimus levels are 5-8    No anemia or leukopenia.    Bone mineral disease: Calcium and phosphorus levels are optimal recent vitamin D levels are 23 continue with the  current management      General health care: Recommended age-appropriate screening, COVID shots annual dermatology visits,DEXA scan 2-3 yrs while on steroids .    Labs in a week and follow-up in clinic in 2 months    Elaina Ling MD    Notes created by Tatiana -Please excuse the Typos .         [1]   Current Outpatient Medications:     amitriptyline (Elavil) 100 mg tablet, Take 1 tablet (100 mg) by mouth once daily., Disp: , Rfl:     aspirin 81 mg EC tablet, Take 1 tablet (81 mg) by mouth once daily., Disp: , Rfl:     blood sugar diagnostic (Contour Next Test Strips) strip, Use 5 strips daily, Disp: , Rfl:     carvedilol (Coreg) 25 mg tablet, Take 1 tablet (25 mg) by mouth 2 times a day., Disp: 180 tablet, Rfl: 3    furosemide (Lasix) 40 mg tablet, Take 1 tablet (40 mg) by mouth once daily., Disp: 90 tablet, Rfl: 3    glucagon (Gvoke HypoPen 2-Pack) 1 mg/0.2 mL auto-injector, Use as directed, Disp: , Rfl:     insulin aspart (NovoLOG) 100 unit/mL injection, Inject subcutaneously 60 units daily via insulin pump. DX: E10.65., Disp: , Rfl:     insulin glargine (Lantus Solostar U-100 Insulin) 100 unit/mL (3 mL) pen, Inject subcutaneously 62 units daily if insulin pump fails. DX: E10.649, Disp: , Rfl:     insulin lispro (HumaLOG) 100 unit/mL injection, Inject under the skin., Disp: , Rfl:     insulin regular (HumuLIN R) 100 unit/mL injection, Inject under the skin., Disp: , Rfl:     levothyroxine (Synthroid, Levoxyl) 50 mcg tablet, Take 1 tablet (50 mcg) by mouth once daily., Disp: , Rfl:     lisinopril 20 mg tablet, Take 1 tablet (20 mg) by mouth once daily., Disp: 90 tablet, Rfl: 3    mycophenolate (Cellcept) 250 mg capsule, Take 3 capsules (750 mg) by mouth 2 times a day., Disp: 540 capsule, Rfl: 3    pantoprazole (ProtoNix) 40 mg packet, Take 1 packet (40 mg) by mouth., Disp: , Rfl:     pravastatin (Pravachol) 40 mg tablet, Take 1 tablet (40 mg) by mouth once daily at bedtime., Disp: , Rfl:     predniSONE  (Deltasone) 5 mg tablet, Take 1 tablet (5 mg) by mouth once daily., Disp: 90 tablet, Rfl: 3    sildenafil (Viagra) 50 mg tablet, Take 1-2 tablets 1 hour  Before anticipated intercourse., Disp: , Rfl:     tacrolimus (Prograf) 0.5 mg capsule, Take 1 capsule (0.5 mg) by mouth 2 times a day., Disp: 60 capsule, Rfl: 11

## 2025-07-30 NOTE — PATIENT INSTRUCTIONS
Tac 1 mg in the morning and 0.5 mg in the evening   Lokelma 5 mg daily  Referred to Vascular surgery Dr. Liya Lisa in 1 week  RTCin 2 months

## 2025-08-12 ENCOUNTER — HOSPITAL ENCOUNTER (OUTPATIENT)
Dept: VASCULAR MEDICINE | Facility: CLINIC | Age: 40
Discharge: HOME | End: 2025-08-12
Payer: MEDICARE

## 2025-08-12 DIAGNOSIS — N18.6 ESRD (END STAGE RENAL DISEASE) (MULTI): ICD-10-CM

## 2025-08-12 DIAGNOSIS — M79.642 LEFT HAND PAIN: ICD-10-CM

## 2025-08-12 DIAGNOSIS — I73.9 PAD (PERIPHERAL ARTERY DISEASE): ICD-10-CM

## 2025-08-21 ENCOUNTER — OFFICE VISIT (OUTPATIENT)
Dept: VASCULAR SURGERY | Facility: HOSPITAL | Age: 40
End: 2025-08-21
Payer: MEDICARE

## 2025-08-21 VITALS
BODY MASS INDEX: 36.24 KG/M2 | OXYGEN SATURATION: 99 % | WEIGHT: 225.5 LBS | SYSTOLIC BLOOD PRESSURE: 149 MMHG | DIASTOLIC BLOOD PRESSURE: 76 MMHG | HEART RATE: 89 BPM | HEIGHT: 66 IN

## 2025-08-21 DIAGNOSIS — I96: ICD-10-CM

## 2025-08-21 DIAGNOSIS — S61.401A: ICD-10-CM

## 2025-08-21 DIAGNOSIS — S69.91XA FINGER INJURY, RIGHT, INITIAL ENCOUNTER: Primary | ICD-10-CM

## 2025-08-21 PROBLEM — S99.921A FOOT TRAUMA, RIGHT, INITIAL ENCOUNTER: Status: ACTIVE | Noted: 2023-11-03

## 2025-08-21 PROBLEM — M86.9: Status: ACTIVE | Noted: 2023-12-01

## 2025-08-21 PROBLEM — M86.172: Status: ACTIVE | Noted: 2023-12-01

## 2025-08-21 PROBLEM — R42 DIZZINESS: Status: ACTIVE | Noted: 2025-08-21

## 2025-08-21 PROBLEM — E10.42 TYPE 1 DIABETES MELLITUS WITH POLYNEUROPATHY: Status: ACTIVE | Noted: 2023-08-26

## 2025-08-21 PROBLEM — R65.10 SIRS (SYSTEMIC INFLAMMATORY RESPONSE SYNDROME) (MULTI): Status: ACTIVE | Noted: 2024-02-06

## 2025-08-21 PROBLEM — E11.42 DIABETIC POLYNEUROPATHY (MULTI): Status: ACTIVE | Noted: 2024-02-02

## 2025-08-21 PROBLEM — S99.922A FOOT TRAUMA, LEFT, INITIAL ENCOUNTER: Status: ACTIVE | Noted: 2023-11-03

## 2025-08-21 PROBLEM — L97.911 ULCER OF RIGHT LEG, LIMITED TO BREAKDOWN OF SKIN: Status: ACTIVE | Noted: 2024-06-14

## 2025-08-21 PROBLEM — A41.9 SEPSIS (MULTI): Status: ACTIVE | Noted: 2025-08-21

## 2025-08-21 PROBLEM — M86.671 CHRONIC OSTEOMYELITIS OF RIGHT FOOT (MULTI): Status: ACTIVE | Noted: 2023-10-20

## 2025-08-21 PROCEDURE — 3077F SYST BP >= 140 MM HG: CPT | Performed by: SURGERY

## 2025-08-21 PROCEDURE — 4010F ACE/ARB THERAPY RXD/TAKEN: CPT | Performed by: SURGERY

## 2025-08-21 PROCEDURE — 3008F BODY MASS INDEX DOCD: CPT | Performed by: SURGERY

## 2025-08-21 PROCEDURE — 99202 OFFICE O/P NEW SF 15 MIN: CPT | Performed by: SURGERY

## 2025-08-21 PROCEDURE — 3078F DIAST BP <80 MM HG: CPT | Performed by: SURGERY

## 2025-08-21 PROCEDURE — 99212 OFFICE O/P EST SF 10 MIN: CPT | Performed by: SURGERY

## 2025-08-21 RX ORDER — ACETAMINOPHEN 325 MG/1
TABLET ORAL EVERY 4 HOURS PRN
COMMUNITY

## 2025-08-21 ASSESSMENT — ENCOUNTER SYMPTOMS
DEPRESSION: 0
OCCASIONAL FEELINGS OF UNSTEADINESS: 0
LOSS OF SENSATION IN FEET: 1

## 2025-08-21 ASSESSMENT — PAIN SCALES - GENERAL: PAINLEVEL_OUTOF10: 2

## 2025-09-16 ENCOUNTER — APPOINTMENT (OUTPATIENT)
Dept: ORTHOPEDIC SURGERY | Facility: HOSPITAL | Age: 40
End: 2025-09-16
Payer: MEDICARE